# Patient Record
Sex: MALE | Race: WHITE | NOT HISPANIC OR LATINO | Employment: FULL TIME | URBAN - METROPOLITAN AREA
[De-identification: names, ages, dates, MRNs, and addresses within clinical notes are randomized per-mention and may not be internally consistent; named-entity substitution may affect disease eponyms.]

---

## 2022-02-22 ENCOUNTER — OFFICE VISIT (OUTPATIENT)
Dept: ENDOCRINOLOGY | Facility: CLINIC | Age: 62
End: 2022-02-22

## 2022-02-22 VITALS
WEIGHT: 220 LBS | OXYGEN SATURATION: 97 % | DIASTOLIC BLOOD PRESSURE: 74 MMHG | HEART RATE: 58 BPM | SYSTOLIC BLOOD PRESSURE: 118 MMHG | HEIGHT: 70 IN | BODY MASS INDEX: 31.5 KG/M2

## 2022-02-22 DIAGNOSIS — E11.65 UNCONTROLLED TYPE 2 DIABETES MELLITUS WITH HYPERGLYCEMIA: Primary | ICD-10-CM

## 2022-02-22 LAB
EXPIRATION DATE: NORMAL
EXPIRATION DATE: NORMAL
GLUCOSE BLDC GLUCOMTR-MCNC: 130 MG/DL (ref 70–130)
HBA1C MFR BLD: 8.1 %
Lab: NORMAL
Lab: NORMAL

## 2022-02-22 PROCEDURE — 82947 ASSAY GLUCOSE BLOOD QUANT: CPT | Performed by: INTERNAL MEDICINE

## 2022-02-22 PROCEDURE — 99204 OFFICE O/P NEW MOD 45 MIN: CPT | Performed by: INTERNAL MEDICINE

## 2022-02-22 PROCEDURE — 83036 HEMOGLOBIN GLYCOSYLATED A1C: CPT | Performed by: INTERNAL MEDICINE

## 2022-02-22 PROCEDURE — 3052F HG A1C>EQUAL 8.0%<EQUAL 9.0%: CPT | Performed by: INTERNAL MEDICINE

## 2022-02-22 RX ORDER — BLOOD-GLUCOSE METER
KIT MISCELLANEOUS DAILY
COMMUNITY
Start: 2022-01-25

## 2022-02-22 RX ORDER — EMPAGLIFLOZIN 10 MG/1
TABLET, FILM COATED ORAL DAILY
COMMUNITY
Start: 2021-12-09 | End: 2022-12-14 | Stop reason: SDUPTHER

## 2022-02-22 RX ORDER — MULTIPLE VITAMINS W/ MINERALS TAB 9MG-400MCG
1 TAB ORAL DAILY
COMMUNITY

## 2022-02-22 RX ORDER — PIOGLITAZONEHYDROCHLORIDE 30 MG/1
TABLET ORAL DAILY
COMMUNITY
Start: 2021-12-03 | End: 2022-02-22

## 2022-02-22 RX ORDER — INSULIN DETEMIR 100 [IU]/ML
INJECTION, SOLUTION SUBCUTANEOUS
COMMUNITY
Start: 2022-02-02

## 2022-02-22 RX ORDER — FUROSEMIDE 20 MG/1
TABLET ORAL DAILY
COMMUNITY
Start: 2021-12-03 | End: 2022-06-14

## 2022-02-22 RX ORDER — LANOLIN ALCOHOL/MO/W.PET/CERES
CREAM (GRAM) TOPICAL DAILY
COMMUNITY

## 2022-02-22 RX ORDER — CARVEDILOL 25 MG/1
TABLET ORAL
COMMUNITY
Start: 2021-12-13

## 2022-02-22 RX ORDER — PEN NEEDLE, DIABETIC 32 GX 1/4"
NEEDLE, DISPOSABLE MISCELLANEOUS
COMMUNITY
Start: 2021-11-22

## 2022-02-22 RX ORDER — METFORMIN HYDROCHLORIDE 500 MG/1
1000 TABLET, EXTENDED RELEASE ORAL
Qty: 180 TABLET | Refills: 3 | Status: SHIPPED | OUTPATIENT
Start: 2022-02-22 | End: 2022-12-14 | Stop reason: SDUPTHER

## 2022-02-22 RX ORDER — MELOXICAM 15 MG/1
TABLET ORAL DAILY
COMMUNITY
Start: 2021-11-17

## 2022-02-22 RX ORDER — ASPIRIN 81 MG/1
81 TABLET, CHEWABLE ORAL DAILY
COMMUNITY

## 2022-02-22 RX ORDER — LANCETS 28 GAUGE
EACH MISCELLANEOUS DAILY
COMMUNITY
Start: 2022-01-25

## 2022-02-22 RX ORDER — VALSARTAN AND HYDROCHLOROTHIAZIDE 320; 25 MG/1; MG/1
TABLET, FILM COATED ORAL DAILY
COMMUNITY
Start: 2022-01-03

## 2022-02-22 RX ORDER — GLIPIZIDE 5 MG/1
TABLET, FILM COATED, EXTENDED RELEASE ORAL DAILY
COMMUNITY
Start: 2021-11-17 | End: 2022-02-22

## 2022-02-22 NOTE — PROGRESS NOTES
"     Office Note      Date: 2022  Patient Name: Tk Penaloza  MRN: [id number]  : 1960    Chief Complaint   Patient presents with   • Diabetes       History of Present Illness:   Tk Penaloza is a 61 y.o. male who presents for Diabetes - type 2  He is seen as a new patient today  -----------------------------------------  He was diagnosed about 8 years ago.  At diagnosis his sugar was >1300 and he was treated in the ER and sent home on metformin.  He is currently on ramon/ jardiance/ sfu and insulin. Metformin was stopped when he went on insulin   He did not tolerate trulicity     Last A1c: 9.3      Subjective      Diabetic Complications:  Eyes: No  Kidneys: No  Feet: No  Heart: No    Diet and Exercise:  Meals per day: 3  Minutes of exercise per week: not much    Review of Systems:   Review of Systems   Constitutional: Positive for fatigue and unexpected weight change.   Eyes: Positive for visual disturbance.   Respiratory: Positive for shortness of breath.    Cardiovascular: Positive for leg swelling.   Endocrine: Negative for cold intolerance and heat intolerance.   Psychiatric/Behavioral: Positive for sleep disturbance.       The following portions of the patient's history were reviewed and updated as appropriate: allergies, current medications, past family history, past medical history, past social history, past surgical history and problem list.    Objective     Visit Vitals  /74   Pulse 58   Ht 177.8 cm (70\")   Wt 99.8 kg (220 lb)   SpO2 97%   BMI 31.57 kg/m²       Labs:    CMP  No results found for: GLUCOSE, BUN, CREATININE, EGFRIFNONA, EGFRIFAFRI, BCR, K, CO2, CALCIUM, PROTENTOTREF, LABIL2, BILIRUBIN, AST, ALT     CBC w/DIFF  No results found for: WBC, RBC, HGB, HCT, MCV, MCH, MCHC, RDW, RDWSD, MPV, PLT, NEUTRORELPCT, LYMPHORELPCT, MONORELPCT, EOSRELPCT, BASORELPCT, AUTOIGPER, NEUTROABS, LYMPHSABS, MONOSABS, EOSABS, BASOSABS, AUTOIGNUM, NRBC    Physical Exam:  Physical Exam  Vitals reviewed. "   Constitutional:       General: He is not in acute distress.     Appearance: Normal appearance. He is obese. He is not ill-appearing, toxic-appearing or diaphoretic.   HENT:      Head: Normocephalic and atraumatic.   Eyes:      Extraocular Movements: Extraocular movements intact.   Neck:      Comments: No goiter  Cardiovascular:      Rate and Rhythm: Normal rate and regular rhythm.      Pulses:           Dorsalis pedis pulses are 2+ on the right side and 2+ on the left side.        Posterior tibial pulses are 2+ on the right side and 2+ on the left side.   Pulmonary:      Effort: Pulmonary effort is normal. No respiratory distress.   Musculoskeletal:      Right foot: Normal range of motion. No deformity, bunion, Charcot foot, foot drop or prominent metatarsal heads.      Left foot: Normal range of motion. No deformity, bunion, Charcot foot, foot drop or prominent metatarsal heads.   Feet:      Right foot:      Protective Sensation: 5 sites tested. 5 sites sensed.      Skin integrity: Skin integrity normal.      Toenail Condition: Right toenails are normal.      Left foot:      Protective Sensation: 5 sites tested. 5 sites sensed.      Skin integrity: Skin integrity normal.      Toenail Condition: Left toenails are normal.      Comments: Diabetic Foot Exam Performed and Monofilament Test Performed    Lymphadenopathy:      Cervical: No cervical adenopathy.   Neurological:      Mental Status: He is alert.   Psychiatric:         Mood and Affect: Mood normal.         Thought Content: Thought content normal.         Judgment: Judgment normal.         Assessment / Plan      Assessment & Plan:  Problem List Items Addressed This Visit        Other    Uncontrolled type 2 diabetes mellitus with hyperglycemia (HCC) - Primary    Overview     Intolerant of trulicity          Current Assessment & Plan      a1c well above goal.  With heart disease and edema probably ought not to take ramon. I would prefer he be on metformin and he  agrees to try again. Will use januvia.   Could maybe use soliqua in future as its glp1 is easier to tolerate than trulicity.  Diet and exercise options were reviewed in detail.          Relevant Medications    Levemir FlexTouch 100 UNIT/ML injection    Jardiance 10 MG tablet tablet    SITagliptin (Januvia) 100 MG tablet    metFORMIN ER (GLUCOPHAGE-XR) 500 MG 24 hr tablet    Other Relevant Orders    POC Glycosylated Hemoglobin (Hb A1C) (Completed)    POC Glucose, Blood (Completed)           Dipak Harris MD   02/22/2022

## 2022-02-22 NOTE — ASSESSMENT & PLAN NOTE
a1c well above goal.  With heart disease and edema probably ought not to take ramon. I would prefer he be on metformin and he agrees to try again. Will use januvia.   Could maybe use soliqua in future as its glp1 is easier to tolerate than trulicity.  Diet and exercise options were reviewed in detail.

## 2022-06-14 ENCOUNTER — OFFICE VISIT (OUTPATIENT)
Dept: ENDOCRINOLOGY | Facility: CLINIC | Age: 62
End: 2022-06-14

## 2022-06-14 VITALS
DIASTOLIC BLOOD PRESSURE: 60 MMHG | BODY MASS INDEX: 28.92 KG/M2 | OXYGEN SATURATION: 96 % | SYSTOLIC BLOOD PRESSURE: 124 MMHG | HEIGHT: 70 IN | WEIGHT: 202 LBS | HEART RATE: 64 BPM

## 2022-06-14 DIAGNOSIS — E11.65 UNCONTROLLED TYPE 2 DIABETES MELLITUS WITH HYPERGLYCEMIA: Primary | ICD-10-CM

## 2022-06-14 LAB
EXPIRATION DATE: ABNORMAL
EXPIRATION DATE: NORMAL
GLUCOSE BLDC GLUCOMTR-MCNC: 150 MG/DL (ref 70–130)
HBA1C MFR BLD: 6.3 %
Lab: ABNORMAL
Lab: NORMAL

## 2022-06-14 PROCEDURE — 3044F HG A1C LEVEL LT 7.0%: CPT | Performed by: INTERNAL MEDICINE

## 2022-06-14 PROCEDURE — 83036 HEMOGLOBIN GLYCOSYLATED A1C: CPT | Performed by: INTERNAL MEDICINE

## 2022-06-14 PROCEDURE — 82947 ASSAY GLUCOSE BLOOD QUANT: CPT | Performed by: INTERNAL MEDICINE

## 2022-06-14 PROCEDURE — 99213 OFFICE O/P EST LOW 20 MIN: CPT | Performed by: INTERNAL MEDICINE

## 2022-06-14 RX ORDER — ALIROCUMAB 150 MG/ML
INJECTION, SOLUTION SUBCUTANEOUS
COMMUNITY
Start: 2022-06-13

## 2022-06-14 RX ORDER — BEMPEDOIC ACID 180 MG/1
180 TABLET, FILM COATED ORAL DAILY
COMMUNITY
Start: 2022-06-03 | End: 2022-12-14

## 2022-06-14 RX ORDER — BUPROPION HYDROCHLORIDE 75 MG/1
75 TABLET ORAL DAILY
COMMUNITY
Start: 2022-06-10 | End: 2022-12-14

## 2022-06-14 NOTE — PROGRESS NOTES
"     Office Note      Date: 2022  Patient Name: Tk Penaloza  MRN: [id number]  : 1960    Chief Complaint   Patient presents with   • Diabetes       History of Present Illness:   Tk Penaloza is a 62 y.o. male who presents for Diabetes - last time I added metformin and januvia.  He is tolerating them  He has lost 18 pounds.  He is feeling better.    He has had a couple of lows in the morning    Last A1c:  Hemoglobin A1C   Date Value Ref Range Status   2022 6.3 % Final       Changes in health since last visit: non3 . Last eye exam up to date.    Subjective            Review of Systems:   Review of Systems   Constitutional: Negative.    HENT: Negative.    Eyes: Negative.    Respiratory: Negative.        The following portions of the patient's history were reviewed and updated as appropriate: allergies, current medications, past family history, past medical history, past social history, past surgical history and problem list.    Objective     Visit Vitals  /60   Pulse 64   Ht 177.8 cm (70\")   Wt 91.6 kg (202 lb)   SpO2 96%   BMI 28.98 kg/m²       Labs:    CMP  No results found for: GLUCOSE, BUN, CREATININE, EGFRIFNONA, EGFRIFAFRI, BCR, K, CO2, CALCIUM, PROTENTOTREF, LABIL2, BILIRUBIN, AST, ALT     CBC w/DIFF  No results found for: WBC, RBC, HGB, HCT, MCV, MCH, MCHC, RDW, RDWSD, MPV, PLT, NEUTRORELPCT, LYMPHORELPCT, MONORELPCT, EOSRELPCT, BASORELPCT, AUTOIGPER, NEUTROABS, LYMPHSABS, MONOSABS, EOSABS, BASOSABS, AUTOIGNUM, NRBC    Physical Exam:  Physical Exam  Vitals reviewed.   Constitutional:       Appearance: Normal appearance.   Neurological:      Mental Status: He is alert.   Psychiatric:         Mood and Affect: Mood normal.         Thought Content: Thought content normal.         Judgment: Judgment normal.          Assessment / Plan      Assessment & Plan:  Problem List Items Addressed This Visit        Other    Uncontrolled type 2 diabetes mellitus with hyperglycemia (HCC) - Primary    " Overview     Intolerant of trulicity            Current Assessment & Plan      Dramatic improvement in a1c and weight  Reduce insulin to 70 units per day            Relevant Medications    Levemir FlexTouch 100 UNIT/ML injection    Jardiance 10 MG tablet tablet    SITagliptin (Januvia) 100 MG tablet    metFORMIN ER (GLUCOPHAGE-XR) 500 MG 24 hr tablet    Other Relevant Orders    POC Glucose, Blood (Completed)    POC Glycosylated Hemoglobin (Hb A1C) (Completed)           Dipak Harris MD   06/14/2022

## 2022-12-14 ENCOUNTER — OFFICE VISIT (OUTPATIENT)
Dept: ENDOCRINOLOGY | Facility: CLINIC | Age: 62
End: 2022-12-14

## 2022-12-14 VITALS
HEART RATE: 79 BPM | DIASTOLIC BLOOD PRESSURE: 77 MMHG | OXYGEN SATURATION: 95 % | SYSTOLIC BLOOD PRESSURE: 118 MMHG | BODY MASS INDEX: 28.92 KG/M2 | HEIGHT: 70 IN | WEIGHT: 202 LBS

## 2022-12-14 DIAGNOSIS — E11.65 UNCONTROLLED TYPE 2 DIABETES MELLITUS WITH HYPERGLYCEMIA: Primary | ICD-10-CM

## 2022-12-14 LAB
EXPIRATION DATE: ABNORMAL
EXPIRATION DATE: NORMAL
GLUCOSE BLDC GLUCOMTR-MCNC: 245 MG/DL (ref 70–130)
HBA1C MFR BLD: 8.3 %
Lab: ABNORMAL
Lab: NORMAL

## 2022-12-14 PROCEDURE — 3052F HG A1C>EQUAL 8.0%<EQUAL 9.0%: CPT | Performed by: INTERNAL MEDICINE

## 2022-12-14 PROCEDURE — 99213 OFFICE O/P EST LOW 20 MIN: CPT | Performed by: INTERNAL MEDICINE

## 2022-12-14 PROCEDURE — 83036 HEMOGLOBIN GLYCOSYLATED A1C: CPT | Performed by: INTERNAL MEDICINE

## 2022-12-14 PROCEDURE — 82947 ASSAY GLUCOSE BLOOD QUANT: CPT | Performed by: INTERNAL MEDICINE

## 2022-12-14 RX ORDER — METFORMIN HYDROCHLORIDE 500 MG/1
1000 TABLET, EXTENDED RELEASE ORAL
Qty: 180 TABLET | Refills: 3 | Status: SHIPPED | OUTPATIENT
Start: 2022-12-14

## 2022-12-14 RX ORDER — BUPROPION HYDROCHLORIDE 150 MG/1
TABLET ORAL
COMMUNITY
Start: 2022-11-14

## 2022-12-14 RX ORDER — EMPAGLIFLOZIN 10 MG/1
10 TABLET, FILM COATED ORAL DAILY
Qty: 90 TABLET | Refills: 3 | Status: SHIPPED | OUTPATIENT
Start: 2022-12-14

## 2022-12-14 NOTE — PROGRESS NOTES
"     Office Note      Date: 2022  Patient Name: Tk Penaloza  MRN: [id number]  : 1960    Chief Complaint   Patient presents with   • Diabetes       History of Present Illness:   Tk Penaloza is a 62 y.o. male who presents for Diabetes -type 2  Rx: jardiance, januvia, metformin and insulin     bg checks are done weekly  Last A1c:6.6  Hemoglobin A1C   Date Value Ref Range Status   2022 8.3 % Final   hypoglycemia is rare     Changes in health since last visit:has been eating some sweets over the holidays  . Last eye exam up to date.    Subjective            Review of Systems:   Review of Systems   Constitutional: Negative.    HENT: Negative.    Eyes: Negative.    Respiratory: Negative.        The following portions of the patient's history were reviewed and updated as appropriate: allergies, current medications, past family history, past medical history, past social history, past surgical history and problem list.    Objective     Visit Vitals  /77   Pulse 79   Ht 177.8 cm (70\")   Wt 91.6 kg (202 lb)   SpO2 95%   BMI 28.98 kg/m²         Physical Exam:  Physical Exam  Vitals reviewed.   Constitutional:       Appearance: Normal appearance.   Neurological:      Mental Status: He is alert.   Psychiatric:         Mood and Affect: Mood normal.         Thought Content: Thought content normal.         Judgment: Judgment normal.          Assessment / Plan      Assessment & Plan:  Problem List Items Addressed This Visit        Other    Uncontrolled type 2 diabetes mellitus with hyperglycemia (HCC) - Primary    Overview     Intolerant of trulicity          Current Assessment & Plan     Diabetes is worsening.   due to poor diet choices   Diabetes will be reassessed in 6 months.         Relevant Medications    Levemir FlexTouch 100 UNIT/ML injection    Jardiance 10 MG tablet tablet    SITagliptin (Januvia) 100 MG tablet    metFORMIN ER (GLUCOPHAGE-XR) 500 MG 24 hr tablet        Dipak Harris MD "   12/14/2022

## 2023-06-14 ENCOUNTER — OFFICE VISIT (OUTPATIENT)
Dept: ENDOCRINOLOGY | Facility: CLINIC | Age: 63
End: 2023-06-14
Payer: MEDICAID

## 2023-06-14 VITALS
HEIGHT: 70 IN | DIASTOLIC BLOOD PRESSURE: 74 MMHG | HEART RATE: 72 BPM | SYSTOLIC BLOOD PRESSURE: 120 MMHG | WEIGHT: 201 LBS | OXYGEN SATURATION: 98 % | BODY MASS INDEX: 28.77 KG/M2

## 2023-06-14 DIAGNOSIS — E11.65 UNCONTROLLED TYPE 2 DIABETES MELLITUS WITH HYPERGLYCEMIA: Primary | ICD-10-CM

## 2023-06-14 LAB
EXPIRATION DATE: NORMAL
EXPIRATION DATE: NORMAL
GLUCOSE BLDC GLUCOMTR-MCNC: 96 MG/DL (ref 70–130)
HBA1C MFR BLD: 7.9 %
Lab: NORMAL
Lab: NORMAL

## 2023-06-14 RX ORDER — BLOOD-GLUCOSE SENSOR
1 EACH MISCELLANEOUS
Qty: 2 EACH | Refills: 11 | Status: SHIPPED | OUTPATIENT
Start: 2023-06-14

## 2023-06-14 NOTE — ASSESSMENT & PLAN NOTE
Diabetes is improving with lifestyle modifications.   Continue current treatment regimen.  Diabetes will be reassessed in 6 months  The plan is to try to get him on a lee.

## 2023-06-14 NOTE — PROGRESS NOTES
"     Office Note      Date: 2023  Patient Name: Tk Penaloza  MRN: [id number]  : 1960    Chief Complaint   Patient presents with    Diabetes       History of Present Illness:   Tk Penaloza is a 63 y.o. male who presents for Diabetes type 2.   Current RX 2insulin shots per day/ jardiance/ januvia and metformin     Bg checks are done: sometimes   Hypoglycemia :mild lows once a month      Last A1c:  Hemoglobin A1C   Date Value Ref Range Status   2023 7.9 % Final       Changes in health since last visit: none . Last eye exam due and advised  .    Subjective              Review of Systems:   Review of Systems   Constitutional: Negative.    HENT: Negative.     Eyes: Negative.    Respiratory: Negative.       The following portions of the patient's history were reviewed and updated as appropriate: allergies, current medications, past family history, past medical history, past social history, past surgical history, and problem list.    Objective     Visit Vitals  /74   Pulse 72   Ht 177.8 cm (70\")   Wt 91.2 kg (201 lb)   SpO2 98%   BMI 28.84 kg/m²           Physical Exam:  Physical Exam  Vitals reviewed.   Constitutional:       Appearance: Normal appearance. He is normal weight.   Cardiovascular:      Pulses:           Dorsalis pedis pulses are 2+ on the right side and 2+ on the left side.        Posterior tibial pulses are 2+ on the right side and 2+ on the left side.   Musculoskeletal:      Right foot: Normal range of motion. No deformity, bunion, Charcot foot, foot drop or prominent metatarsal heads.      Left foot: Normal range of motion. No deformity, bunion, Charcot foot, foot drop or prominent metatarsal heads.   Feet:      Right foot:      Protective Sensation: 10 sites tested.  10 sites sensed.      Skin integrity: Skin integrity normal.      Toenail Condition: Right toenails are normal.      Left foot:      Protective Sensation: 10 sites tested.  10 sites sensed.      Skin integrity: Skin " integrity normal.      Toenail Condition: Left toenails are normal.      Comments: Diabetic Foot Exam Performed and Monofilament Test Performed    Neurological:      Mental Status: He is alert.   Psychiatric:         Mood and Affect: Mood normal.         Behavior: Behavior normal.         Thought Content: Thought content normal.         Judgment: Judgment normal.        Assessment / Plan      Assessment & Plan:  Problem List Items Addressed This Visit          Other    Uncontrolled type 2 diabetes mellitus with hyperglycemia - Primary    Overview     Intolerant of trulicity          Current Assessment & Plan     Diabetes is improving with lifestyle modifications.   Continue current treatment regimen.  Diabetes will be reassessed in 6 months  The plan is to try to get him on a bertin.         Relevant Medications    Levemir FlexTouch 100 UNIT/ML injection    metFORMIN ER (GLUCOPHAGE-XR) 500 MG 24 hr tablet    Jardiance 10 MG tablet tablet    SITagliptin (Januvia) 100 MG tablet    Continuous Blood Gluc Sensor (FreeStyle Bertin 3 Sensor) misc    Other Relevant Orders    POC Glucose, Blood (Completed)    POC Glycosylated Hemoglobin (Hb A1C) (Completed)    Microalbumin / Creatinine Urine Ratio - Urine, Clean Catch        Dipak Orville Harris MD   06/14/2023

## 2023-06-15 LAB
ALBUMIN/CREAT UR: 46 MG/G CREAT (ref 0–29)
CREAT UR-MCNC: 53 MG/DL
MICROALBUMIN UR-MCNC: 24.5 UG/ML

## 2023-10-17 ENCOUNTER — TELEPHONE (OUTPATIENT)
Dept: ENDOCRINOLOGY | Facility: CLINIC | Age: 63
End: 2023-10-17

## 2023-10-17 NOTE — TELEPHONE ENCOUNTER
Spoke with patients wife.  She states that he is due for a refill on Jardiance and Januvia.  She was not sure if he was supposed to be taking both.  She thought he was supposed to stop one of them.  She states that she thought someone called her after last appointment and told them to stop one of them.  Could not find in chart.

## 2023-10-17 NOTE — TELEPHONE ENCOUNTER
PATIENTS WIFE WOULD LIKE A CALL BACK TO DISCUSS PATIENTS MEDICATIONS. SHE IS NOT SURE WHAT MEDICATIONS PATIENT IS SUPPOSE TO TAKE AND STOP TAKING. PHONE NUMBER [phone]

## 2024-01-10 RX ORDER — METFORMIN HYDROCHLORIDE 500 MG/1
1000 TABLET, EXTENDED RELEASE ORAL
Qty: 180 TABLET | Refills: 3 | Status: SHIPPED | OUTPATIENT
Start: 2024-01-10

## 2024-01-10 NOTE — TELEPHONE ENCOUNTER
Rx Refill Note  Requested Prescriptions     Pending Prescriptions Disp Refills    metFORMIN ER (GLUCOPHAGE-XR) 500 MG 24 hr tablet [Pharmacy Med Name: METFORMIN HCL  MG TABLET] 180 tablet 3     Sig: TAKE TWO TABLETS BY MOUTH DAILY WITH BREAKFAST          Last office visit with prescribing clinician: 6/14/2023     Next office visit with prescribing clinician: 4/25/2024         Dariela Kilpatrick MA  01/10/24, 11:46 EST

## 2024-01-19 RX ORDER — SITAGLIPTIN 100 MG/1
100 TABLET, FILM COATED ORAL DAILY
Qty: 90 TABLET | Refills: 3 | Status: SHIPPED | OUTPATIENT
Start: 2024-01-19

## 2024-01-19 NOTE — TELEPHONE ENCOUNTER
Rx Refill Note  Requested Prescriptions     Pending Prescriptions Disp Refills    Januvia 100 MG tablet [Pharmacy Med Name: JANUVIA 100 MG TABLET] 90 tablet 3     Sig: TAKE ONE TABLET BY MOUTH DAILY          Last office visit with prescribing clinician: 6/14/2023     Next office visit with prescribing clinician: 4/25/2024         Dariela Kilpatrick MA  01/19/24, 11:53 EST Rx Refill Note  Requested Prescriptions     Pending Prescriptions Disp Refills    Januvia 100 MG tablet [Pharmacy Med Name: JANUVIA 100 MG TABLET] 90 tablet 3     Sig: TAKE ONE TABLET BY MOUTH DAILY          Last office visit with prescribing clinician: 6/14/2023     Next office visit with prescribing clinician: 4/25/2024         Dariela Kilpatrick MA  01/19/24, 11:46 EST

## 2024-02-08 RX ORDER — EMPAGLIFLOZIN 10 MG/1
10 TABLET, FILM COATED ORAL DAILY
Qty: 90 TABLET | Refills: 0 | Status: SHIPPED | OUTPATIENT
Start: 2024-02-08

## 2024-02-08 NOTE — TELEPHONE ENCOUNTER
Rx Refill Note  Requested Prescriptions     Pending Prescriptions Disp Refills    Jardiance 10 MG tablet tablet [Pharmacy Med Name: JARDIANCE 10 MG TABLET] 90 tablet 3     Sig: TAKE ONE TABLET BY MOUTH DAILY      Last office visit with prescribing clinician: 6/14/2023     Next office visit with prescribing clinician: 4/25/2024

## 2024-04-25 ENCOUNTER — OFFICE VISIT (OUTPATIENT)
Dept: ENDOCRINOLOGY | Facility: CLINIC | Age: 64
End: 2024-04-25
Payer: MEDICAID

## 2024-04-25 VITALS
HEIGHT: 70 IN | WEIGHT: 196.4 LBS | BODY MASS INDEX: 28.12 KG/M2 | HEART RATE: 68 BPM | SYSTOLIC BLOOD PRESSURE: 124 MMHG | DIASTOLIC BLOOD PRESSURE: 72 MMHG

## 2024-04-25 DIAGNOSIS — E11.65 UNCONTROLLED TYPE 2 DIABETES MELLITUS WITH HYPERGLYCEMIA: Primary | ICD-10-CM

## 2024-04-25 LAB
EXPIRATION DATE: ABNORMAL
EXPIRATION DATE: NORMAL
GLUCOSE BLDC GLUCOMTR-MCNC: 125 MG/DL (ref 70–130)
HBA1C MFR BLD: 8.1 % (ref 4.5–5.7)
Lab: ABNORMAL
Lab: NORMAL

## 2024-04-25 RX ORDER — REPAGLINIDE 2 MG/1
2 TABLET ORAL
Qty: 90 TABLET | Refills: 11 | Status: SHIPPED | OUTPATIENT
Start: 2024-04-25 | End: 2025-04-25

## 2024-04-25 RX ORDER — EMPAGLIFLOZIN 10 MG/1
10 TABLET, FILM COATED ORAL DAILY
Qty: 90 TABLET | Refills: 3 | Status: SHIPPED | OUTPATIENT
Start: 2024-04-25

## 2024-04-25 NOTE — PROGRESS NOTES
"     Office Note      Date: 2024  Patient Name: Tk Penaloza  MRN: [id number]  : 1960    Chief Complaint   Patient presents with    Diabetes       History of Present Illness:   Tk Penaloza is a 64 y.o. male who presents for Diabetes type 2.   Current RX JANUVIA/ METFORMIN/ SGLT2 AND INSULIN     Bg checks are done:COUPLE TIMES A WEEK    Hypoglycemia :80'S IN THE MORNING. HIGHER LATER IN THE  DAY       Last A1c:  Hemoglobin A1C   Date Value Ref Range Status   2024 8.1 4.5 - 5.7 % Final       Changes in health since last visit: NONE . HAD A CORTISONE SHOT LAST WEEK. HAS HAD A COUPLE OF THOSE  Last eye exam GOING ON 2 YEARS .    Subjective              Review of Systems:   Review of Systems   Endocrine: Positive for polydipsia and polyuria.       The following portions of the patient's history were reviewed and updated as appropriate: allergies, current medications, past family history, past medical history, past social history, past surgical history, and problem list.    Objective     Visit Vitals  /72   Pulse 68   Ht 177.8 cm (70\")   Wt 89.1 kg (196 lb 6.4 oz)   BMI 28.18 kg/m²           Physical Exam:  Physical Exam  Vitals reviewed.   Constitutional:       Appearance: Normal appearance.   Neurological:      Mental Status: He is alert.   Psychiatric:         Mood and Affect: Mood normal.         Behavior: Behavior normal.         Thought Content: Thought content normal.         Judgment: Judgment normal.          Assessment / Plan      Assessment & Plan:  Problem List Items Addressed This Visit       Uncontrolled type 2 diabetes mellitus with hyperglycemia - Primary    Overview     Intolerant of trulicity          Current Assessment & Plan     A1C UP TO 8.1    THE PLAN IS ADDING PRANDIN AT MEALS         Relevant Medications    Levemir FlexTouch 100 UNIT/ML injection    Continuous Blood Gluc Sensor (FreeStyle Bertin 3 Sensor) misc    metFORMIN ER (GLUCOPHAGE-XR) 500 MG 24 hr tablet    Januvia 100 " MG tablet    repaglinide (PRANDIN) 2 MG tablet    Jardiance 10 MG tablet tablet    Other Relevant Orders    POC Glucose, Blood (Completed)    POC Glycosylated Hemoglobin (Hb A1C) (Completed)         Electronically signed by : Dipak Harris MD  04/25/2024

## 2024-10-28 ENCOUNTER — OFFICE VISIT (OUTPATIENT)
Age: 64
End: 2024-10-28
Payer: MEDICAID

## 2024-10-28 VITALS
OXYGEN SATURATION: 97 % | WEIGHT: 196 LBS | DIASTOLIC BLOOD PRESSURE: 74 MMHG | HEART RATE: 81 BPM | BODY MASS INDEX: 28.06 KG/M2 | SYSTOLIC BLOOD PRESSURE: 126 MMHG | HEIGHT: 70 IN

## 2024-10-28 DIAGNOSIS — E11.65 UNCONTROLLED TYPE 2 DIABETES MELLITUS WITH HYPERGLYCEMIA: Primary | ICD-10-CM

## 2024-10-28 LAB
EXPIRATION DATE: ABNORMAL
EXPIRATION DATE: ABNORMAL
GLUCOSE BLDC GLUCOMTR-MCNC: 189 MG/DL (ref 70–130)
HBA1C MFR BLD: 8.2 % (ref 4.5–5.7)
Lab: ABNORMAL
Lab: ABNORMAL

## 2024-10-28 PROCEDURE — 86341 ISLET CELL ANTIBODY: CPT | Performed by: INTERNAL MEDICINE

## 2024-10-28 PROCEDURE — 82043 UR ALBUMIN QUANTITATIVE: CPT | Performed by: INTERNAL MEDICINE

## 2024-10-28 PROCEDURE — 82570 ASSAY OF URINE CREATININE: CPT | Performed by: INTERNAL MEDICINE

## 2024-10-28 RX ORDER — CLOPIDOGREL BISULFATE 75 MG/1
75 TABLET ORAL DAILY
COMMUNITY

## 2024-10-28 NOTE — PROGRESS NOTES
"     Office Note      Date: 10/28/2024  Patient Name: Tk Penaloza  MRN: [id number]  : 1960    Chief Complaint   Patient presents with    Diabetes       History of Present Illness:   Tk Penaloza is a 64 y.o. male who presents for Diabetes type 2.   Current RX pills and insulin     Bg checks are done: occasionally   Hypoglycemia :rare       Last A1c:  Hemoglobin A1C   Date Value Ref Range Status   10/28/2024 8.2 (A) 4.5 - 5.7 % Final       Changes in health since last visit: son was diagnosed with type 1 . Last eye exam up to date.    Subjective            Review of Systems:   Review of Systems   Endocrine: Positive for polydipsia and polyuria.       The following portions of the patient's history were reviewed and updated as appropriate: allergies, current medications, past family history, past medical history, past social history, past surgical history, and problem list.    Objective     Visit Vitals  /74 (BP Location: Left arm, Patient Position: Sitting, Cuff Size: Adult)   Pulse 81   Ht 177.8 cm (70\")   Wt 88.9 kg (196 lb)   SpO2 97%   BMI 28.12 kg/m²           Physical Exam:  Physical Exam  Vitals reviewed.   Constitutional:       Appearance: Normal appearance. He is normal weight.   Cardiovascular:      Pulses:           Dorsalis pedis pulses are 2+ on the right side and 2+ on the left side.        Posterior tibial pulses are 2+ on the right side and 2+ on the left side.   Musculoskeletal:      Right foot: Normal range of motion. No deformity, bunion, Charcot foot, foot drop or prominent metatarsal heads.      Left foot: Normal range of motion. No deformity, bunion, Charcot foot, foot drop or prominent metatarsal heads.   Feet:      Right foot:      Protective Sensation: 10 sites tested.  10 sites sensed.      Skin integrity: Skin integrity normal.      Toenail Condition: Right toenails are normal.      Left foot:      Protective Sensation: 10 sites tested.  10 sites sensed.      Skin integrity: Skin " integrity normal.      Toenail Condition: Left toenails are normal.      Comments: Diabetic Foot Exam Performed    Neurological:      Mental Status: He is alert.   Psychiatric:         Mood and Affect: Mood normal.         Behavior: Behavior normal.         Thought Content: Thought content normal.         Judgment: Judgment normal.          Assessment / Plan      Assessment & Plan:  Problem List Items Addressed This Visit       Uncontrolled type 2 diabetes mellitus with hyperglycemia - Primary    Overview     Intolerant of trulicity          Current Assessment & Plan      Worse.. the plan is to check serologies since son has type 1.  In mean time, diet and exercise are the keys          Relevant Medications    Levemir FlexTouch 100 UNIT/ML injection    Continuous Blood Gluc Sensor (FreeStyle Bertin 3 Sensor) misc    metFORMIN ER (GLUCOPHAGE-XR) 500 MG 24 hr tablet    Januvia 100 MG tablet    repaglinide (PRANDIN) 2 MG tablet    Jardiance 10 MG tablet tablet    Other Relevant Orders    POC Glucose, Blood (Completed)    POC Glycosylated Hemoglobin (Hb A1C) (Completed)    Glutamic Acid Decarboxylase    Microalbumin / Creatinine Urine Ratio - Urine, Clean Catch         Electronically signed by : Dipak Harris MD  10/28/2024

## 2024-10-28 NOTE — ASSESSMENT & PLAN NOTE
Worse.. the plan is to check serologies since son has type 1.  In mean time, diet and exercise are the keys

## 2024-10-29 ENCOUNTER — SPECIALTY PHARMACY (OUTPATIENT)
Dept: GENERAL RADIOLOGY | Facility: HOSPITAL | Age: 64
End: 2024-10-29
Payer: MEDICAID

## 2024-10-29 DIAGNOSIS — E11.65 UNCONTROLLED TYPE 2 DIABETES MELLITUS WITH HYPERGLYCEMIA: ICD-10-CM

## 2024-10-29 LAB
ALBUMIN UR-MCNC: 3.4 MG/DL
CREAT UR-MCNC: 81.8 MG/DL
MICROALBUMIN/CREAT UR: 41.6 MG/G (ref 0–29)

## 2024-10-29 RX ORDER — INSULIN DETEMIR 100 [IU]/ML
80 INJECTION, SOLUTION SUBCUTANEOUS DAILY
Qty: 90 ML | Refills: 3 | Status: SHIPPED | OUTPATIENT
Start: 2024-10-29 | End: 2024-10-29

## 2024-10-29 RX ORDER — METFORMIN HYDROCHLORIDE 500 MG/1
1000 TABLET, EXTENDED RELEASE ORAL
Qty: 180 TABLET | Refills: 3 | Status: SHIPPED | OUTPATIENT
Start: 2024-10-29

## 2024-10-29 RX ORDER — EMPAGLIFLOZIN 10 MG/1
10 TABLET, FILM COATED ORAL DAILY
Qty: 90 TABLET | Refills: 3 | Status: SHIPPED | OUTPATIENT
Start: 2024-10-29

## 2024-10-29 RX ORDER — INSULIN DETEMIR 100 [IU]/ML
80 INJECTION, SOLUTION SUBCUTANEOUS DAILY
Qty: 90 ML | Refills: 3 | Status: SHIPPED | OUTPATIENT
Start: 2024-10-29

## 2024-10-29 RX ORDER — REPAGLINIDE 2 MG/1
2 TABLET ORAL
Qty: 270 TABLET | Refills: 3 | Status: SHIPPED | OUTPATIENT
Start: 2024-10-29 | End: 2025-10-29

## 2024-10-29 NOTE — PROGRESS NOTES
Specialty Pharmacy Patient Management Program  Endocrinology Initial Fill Outreach      Tk is a 64 y.o. male contacted today regarding initial fill of his medication(s).    Specialty medication(s) and dose(s) confirmed: Januvia 100mg daily    Delivery Questions      Flowsheet Row Most Recent Value   Delivery method UPS   Delivery address verified with patient/caregiver? Yes   Delivery address Home   Number of medications in delivery 2   Medication(s) being filled and delivered SITagliptin Phosphate (JANUVIA), Insulin Detemir (Levemir FlexTouch)   Doses left of specialty medications 3   Copay verified? Yes   Copay amount $0   Copay form of payment No copayment ($0)   Ship Date 10/29/24   Delivery Date 10/30/24   Signature Required Yes            Follow-Up: 90 days  Arvind Shannon, BlossomD  Clinical Specialty Pharmacist, Endocrinology  10/29/2024  08:42 EDT

## 2024-10-29 NOTE — PROGRESS NOTES
Specialty Pharmacy Patient Management Program  Per Protocol Prescription Order/Refill     Patient currently fills medications at [facility] and is enrolled in an Endocrinology Patient Management Program.     Requested Prescriptions     Pending Prescriptions Disp Refills    insulin detemir (Levemir FlexPen) 100 UNIT/ML injection 90 mL 3     Sig: Inject 80 Units under the skin into the appropriate area as directed Daily. (Max 100 units daily)     Signed Prescriptions Disp Refills    SITagliptin (Januvia) 100 MG tablet 90 tablet 3     Sig: Take 1 tablet by mouth Daily.    Jardiance 10 MG tablet tablet 90 tablet 3     Sig: Take 1 tablet by mouth Daily.    metFORMIN ER (GLUCOPHAGE-XR) 500 MG 24 hr tablet 180 tablet 3     Sig: Take 2 tablets by mouth Daily With Breakfast.    repaglinide (PRANDIN) 2 MG tablet 270 tablet 3     Sig: Take 1 tablet by mouth 3 (Three) Times a Day Before Meals.     Prescription orders above were sent to the pharmacy per Collaborative Care Agreement Protocol.     Arvind Shannon, PharmD  Clinical Specialty Pharmacist, Endocrinology  10/29/2024  08:15 EDT

## 2024-10-29 NOTE — PROGRESS NOTES
Specialty Pharmacy Patient Management Program  Endocrinology Initial Assessment     Tk Penaloza was referred by an Endocrinology provider to the Endocrinology Patient Management program offered by [facility] for Type 2 Diabetes on 10/29/24.  An initial outreach was conducted, including assessment of therapy appropriateness and specialty medication education for Jean-Claudeuvia and Jardiance. The patient was introduced to services offered by [facility], including: regular assessments, refill coordination, curbside pick-up or mail order delivery options, prior authorization maintenance, and financial assistance programs as applicable. The patient was also provided with contact information for the pharmacy team.     Insurance Coverage & Financial Support  [place] Medicaid     Relevant Past Medical History and Comorbidities  Relevant medical history and concomitant health conditions were discussed with the patient. The patient's chart has been reviewed for relevant past medical history and comorbid conditions and updated as necessary.  Past Medical History:   Diagnosis Date    Hyperlipidemia     Hypertension     Type 2 diabetes mellitus      Social History     Socioeconomic History    Marital status:    Tobacco Use    Smoking status: Never    Smokeless tobacco: Never   Vaping Use    Vaping status: Never Used   Substance and Sexual Activity    Alcohol use: Not Currently     Comment: rarely    Drug use: Never    Sexual activity: Yes     Partners: Female     Birth control/protection: None       Problem list reviewed by Ze Shannon RPH on 10/29/2024 at  8:28 AM    Allergies  Known allergies and reactions were discussed with the patient. The patient's chart has been reviewed for  allergy information and updated as necessary.   No Known Allergies    Allergies reviewed by Ze Shannon RPH on 10/29/2024 at  8:28 AM    Relevant Laboratory Values  Relevant laboratory  "values were discussed with the patient. The following specialty medication dose adjustment(s) are recommended: No Changes  A1C Last 3 Results          4/25/2024    15:07 10/28/2024    15:02   HGBA1C Last 3 Results   Hemoglobin A1C 8.1  8.2      Lab Results   Component Value Date    HGBA1C 8.2 (A) 10/28/2024     No results found for: \"GLUCOSE\", \"CALCIUM\", \"NA\", \"K\", \"CO2\", \"CL\", \"BUN\", \"CREATININE\", \"EGFRIFAFRI\", \"EGFRIFNONA\", \"BCR\", \"ANIONGAP\"  No results found for: \"CHOL\", \"CHLPL\", \"TRIG\", \"HDL\", \"LDL\", \"LDLDIRECT\"  Microalbumin          10/28/2024    16:22   Microalbumin   Microalbumin, Urine 3.4        Current Medication List  This medication list has been reviewed with the patient and evaluated for any interactions or necessary modifications/recommendations, and updated to include all prescription medications, OTC medications, and supplements the patient is currently taking.  This list reflects what is contained in the patient's profile, which has also been marked as reviewed to communicate to other providers it is the most up to date version of the patient's current medication therapy.     Current Outpatient Medications:     Jardiance 10 MG tablet tablet, Take 1 tablet by mouth Daily., Disp: 90 tablet, Rfl: 3    metFORMIN ER (GLUCOPHAGE-XR) 500 MG 24 hr tablet, Take 2 tablets by mouth Daily With Breakfast., Disp: 180 tablet, Rfl: 3    repaglinide (PRANDIN) 2 MG tablet, Take 1 tablet by mouth 3 (Three) Times a Day Before Meals., Disp: 270 tablet, Rfl: 3    SITagliptin (Januvia) 100 MG tablet, Take 1 tablet by mouth Daily., Disp: 90 tablet, Rfl: 3    aspirin 81 MG chewable tablet, Chew 1 tablet Daily., Disp: , Rfl:     BD Pen Needle Micro U/F 32G X 6 MM misc, Twice daily, Disp: , Rfl:     buPROPion XL (WELLBUTRIN XL) 150 MG 24 hr tablet, , Disp: , Rfl:     carvedilol (COREG) 25 MG tablet, 2 (Two) Times a Day., Disp: , Rfl:     clopidogrel (PLAVIX) 75 MG tablet, Take 1 tablet by mouth Daily., Disp: , Rfl:     " Continuous Blood Gluc Sensor (FreeStyle Bertin 3 Sensor) misc, 1 each Every 14 (Fourteen) Days., Disp: 2 each, Rfl: 11    FREESTYLE LITE test strip, Daily., Disp: , Rfl:     insulin detemir (Levemir FlexPen) 100 UNIT/ML injection, Inject 80 Units under the skin into the appropriate area as directed Daily. (Max 100 units daily), Disp: 90 mL, Rfl: 3    Lancets (freestyle) lancets, Daily., Disp: , Rfl:     meloxicam (MOBIC) 15 MG tablet, Daily., Disp: , Rfl:     multivitamin with minerals tablet tablet, Take 1 tablet by mouth Daily., Disp: , Rfl:     Praluent 150 MG/ML injection pen, , Disp: , Rfl:     valsartan-hydrochlorothiazide (DIOVAN-HCT) 320-25 MG per tablet, Daily., Disp: , Rfl:     vitamin B-12 (CYANOCOBALAMIN) 1000 MCG tablet, Take  by mouth Daily. 2500mg daily, Disp: , Rfl:     Medicines reviewed by Ze Shannon [facility] on 10/29/2024 at  8:28 AM    Drug Interactions  No Clinically Significant DDIs Were Identified at Present Time Upon Marking Medications Reviewed    Recommended Medications Assessment  Aspirin: Currently Taking   Statin: Not Taking Currently  ACEi/ARB: Currently Taking     Initial Education Provided for Specialty Medication  The patient has been provided with the following education and any applicable administration techniques (i.e. self-injection) have been demonstrated for the therapies indicated. All questions and concerns have been addressed prior to the patient receiving the medication, and the patient has verbalized comprehension of the education and any materials provided. Additional patient education shall be provided and documented upon request by the patient, provider, or payer.    JARDIANCE® (empagliflozin)  Medication Expectations   Why am I taking this medication? You could be taking this medication for several reasons:  To lower blood sugar because you have type 2 diabetes  To reduce your risk of death from heart attack or stroke if you have heart disease and type 2  diabetes  To reduce your risk of death or hospitalization for heart failure  To reduce your risk of further kidney damage, death, or hospitalization if you have chronic kidney disease   What should I expect while on this medication? You should expect to see your blood sugar and A1c decrease over time if you have diabetes. You may also see a decrease in your blood pressure and it can help some people lose weight.     How does the medication work? Jardiance works by helping to remove some sugar that the body doesn't need through urination.    How long will I be on this medication for? The amount of time you will be on this medication will be determined by your doctor based on blood sugar and A1c control. You will most likely be on this medication or another diabetes medication throughout your lifetime. Do not abruptly stop this medication without talking to your doctor first.    How do I take this medication? Take as directed on your prescription label. This medication is usually taken in the morning and can be given with or without food.    What are some possible side effects? You may notice increased urination, especially when you first start Jardiance. The most common side effects are urinary tract infections and yeast infections and are more commonly seen in females. Talk with your doctor if you notice white or yellow vaginal discharge, vaginal itching or odor of if you notice redness, itching, pain, or swelling of the penis and/or bad-smelling discharge from the penis.    What happens if I miss a dose? If you miss a dose, take it as soon as you remember. If it is close to your next dose, skip it (do not take 2 doses at once)     Medication Safety   What are things I should warn my doctor immediately about? Tell your doctor if you have kidney disease, liver disease, heart failure, pancreas problems, or history of frequent genital yeast or urinary tract infections. Tell your doctor if you are on a low-salt diet, if  you drink alcohol, or if you are having surgery. Talk to your doctor if you are pregnant, planning to become pregnant, or breastfeeding. Also tell your doctor if you notice any signs/symptoms of an allergic reaction (rash, hives, difficulty breathing, etc.).   What are things that I should be cautious of? Be cautious of any side effects from this medication. Talk to your doctor if any new ones develop or aren't getting better.   What are some medications that can interact with this one? Some medications that interact include diuretics (water pills) and other medications that may also lower your blood sugar such as insulins and glipizide/glimepiride/glyburide. Your doctor may reduce the dose of these medications when you start Jardiance to minimize low blood sugars. Always tell your doctor or pharmacist immediately if you start taking any new medications, including over-the-counter medications, vitamins, and herbal supplements.      Medication Storage/Handling   How should I handle this medication? Keep this medication out of reach of pets/children in tightly sealed container   How does this medication need to be stored? Store at room temperature and keep dry (don't keep in bathroom or other room with moisture)   How should I dispose of this medication? There should not be a need to dispose of this medication unless your provider decides to change the dose or therapy. If that is the case, take to your local police station for proper disposal. Some pharmacies also have take-back bins for medication drop-off.      Resources/Support   How can I remind myself to take this medication? You can download reminder apps to help you manage your refills. You may also set an alarm on your phone to remind you. The pharmacy carries pill boxes that you can place next to an area you pass everyday (such as where you place your car keys or where you charge your phone)   Is financial support available?  [employer] can  provide co-pay cards if you have commercial insurance or patient assistance if you have Medicare or no insurance.    Which vaccines are recommended for me? Talk to your doctor about these vaccines: Flu, Coronavirus (COVID-19), Pneumococcal (pneumonia), Tdap, Hepatitis B, Zoster (shingles)      JANUVIA® (sitagliptin)  Medication Expectations   Why am I taking this medication? You are taking this medication to lower blood sugar because you have type 2 diabetes. Diabetes cannot be cured, but with proper medication and treatment, your blood sugar can be kept within your personalized target range.   What should I expect while on this medication? You should expect to see your blood sugar and A1c decrease over time   How does the medication work? Januvia works by helping to increase the amount of insulin released by your pancreas and lowering the amount of sugar your liver makes.   How long will I be on this medication for? The amount of time you will be on this medication will be determined by your doctor based on blood sugar and A1c control. There is a good chance you will be on this medication or another medication for diabetes throughout your lifetime. You should not abruptly stop this medication without talking to your doctor first.     How do I take this medication? Take as directed on your prescription label. Januvia is usually taken once daily and can be administered with or without food.    What are some possible side effects? The most common side effects include sore throat and runny or stuff nose. You may also experience headache or joint stiffness/pain.  Talk with your doctor if you notice these side effects and they do not go away or get better with time.      What happens if I miss a dose? If you miss a dose, take it as soon as you remember. If it is close to your next dose, skip it (do not take 2 doses at once).       Medication Safety   What are things I should warn my doctor immediately about? Tell your  doctor if you have kidney disease, heart failure, or pancreas problems. Talk to your doctor if you are pregnant, planning to become pregnant, or breastfeeding. Also tell your doctor if you notice any signs/symptoms of an allergic reaction (rash, hives, difficulty breathing, etc.) or blisters on your skin.     What are things that I should be aware of? Be cautious of any side effects from this medication. Talk to your doctor if any new ones develop or aren't getting better.     What are some medications that can interact with this one? Some common medications that interact include other medications that may also lower your blood sugar such as insulins and glipizide/glimepiride/glyburide. Your doctor may reduce the dose of these medications when you start Januvia to minimize low blood sugars. Always tell your doctor or pharmacist immediately if you start taking any new medications, including over-the-counter medications, vitamins, and herbal supplements.        Medication Storage/Handling   How should I handle this medication? Keep this medication out of reach of pets/children in tightly sealed container   How does this medication need to be stored? Store at room temperature and keep dry (don't keep in bathroom or other room with moisture)   How should I dispose of this medication? There should not be a need to dispose of this medication unless your provider decides to change the dose or therapy. If that is the case, take to your local police station for proper disposal. Some pharmacies also have take-back bins for medication drop-off.       Resources/Support   How can I remind myself to take this medication? You can download reminder apps to help you manage your refills. You may also set an alarm on your phone to remind you. The pharmacy carries pill boxes that you can place next to an area you pass everyday (such as where you place your car keys or where you charge your phone)   Is financial support available?   [employer] can provide co-pay cards if you have commercial insurance or patient assistance if you have Medicare or no insurance.    Which vaccines are recommended for me? Talk to your doctor about these vaccines: Flu, Coronavirus (COVID-19), Pneumococcal (pneumonia), Tdap, Hepatitis B, Zoster (shingles)       Adherence and Self-Administration  Adherence related to the patient's specialty therapy was discussed with the patient. The Adherence segment of this outreach has been reviewed and updated.     Is there a concern with patient's ability to self administer the medication correctly and without issue?: No  Were any potential barriers to adherence identified during the initial assessment or patient education?: No  Are there any concerns regarding the patient's understanding of the importance of medication adherence?: No  Methods for Supporting Patient Adherence and/or Self-Administration: I spoke to the patient in person during his office visit and we discussed the medication profile for each medication.    Open Medication Therapy Problems  No medication therapy recommendations to display    Goals of Therapy  Goals related to the patient's specialty therapy were discussed with the patient. The Patient Goals segment of this outreach has been reviewed and updated.   Goals Addressed Today        Specialty Pharmacy General Goal      A1C < 7 %     Lab Results    Component Value Date Notes    HGBA1C 8.2 (A) 10/28/2024 New enrollment. A1C slightly increased. Plan is to continue current medication regimen and work on diet and exercise. Dr. Harris is also checking to see if he is potentially type 1 instead of type 2 DM. NB    HGBA1C 8.1 04/25/2024                  Reassessment Plan & Follow-Up  1. Medication Therapy Changes: No Changes  2. Related Plans, Therapy Recommendations, or Therapy Problems to Be Addressed: Continue current medication regimen and work on diet and exercise. Dr. Harris is also checking to see if the patient  is potentially type 1 instead of type 2 DM. Follow A1C during office visits.  3. Pharmacist to perform regular assessments no more than (6) months from the previous assessment.  4. Care Coordinator to set up future refill outreaches, coordinate prescription delivery, and escalate clinical questions to pharmacist.  5. Welcome information and patient satisfaction survey to be sent by specialty pharmacy team with patient's initial fill.    Attestation  Therapeutic appropriateness: Appropriate   I attest the patient was actively involved in and has agreed to the above plan of care. If the prescribed therapy is at any point deemed not appropriate based on the current or future assessments, a consultation will be initiated with the patient's specialty care provider to determine the best course of action. The revised plan of therapy will be documented along with any required assessments and/or additional patient education provided.     Arvind Shannon, PharmD  Clinical Specialty Pharmacist, Endocrinology  10/29/2024  08:43 EDT    Discussed the aforementioned information with the patient via In-Person.

## 2024-11-01 LAB — GAD65 AB SER IA-ACNC: <5 U/ML (ref 0–5)

## 2024-11-05 ENCOUNTER — SPECIALTY PHARMACY (OUTPATIENT)
Dept: GENERAL RADIOLOGY | Facility: HOSPITAL | Age: 64
End: 2024-11-05
Payer: MEDICAID

## 2024-11-05 RX ORDER — PEN NEEDLE, DIABETIC 32 GX 1/4"
1 NEEDLE, DISPOSABLE MISCELLANEOUS DAILY
Qty: 100 EACH | Refills: 3 | Status: SHIPPED | OUTPATIENT
Start: 2024-11-05

## 2024-11-05 NOTE — PROGRESS NOTES
Specialty Pharmacy Patient Management Program  Per Protocol Prescription Order/Refill     Patient currently fills medications at [facility] and is enrolled in an Endocrinology Patient Management Program.     Requested Prescriptions     Pending Prescriptions Disp Refills    BD Pen Needle Micro U/F 32G X 6 MM misc 100 each 3     Sig: Inject 1 Needle under the skin into the appropriate area as directed Daily.     Prescription orders above were sent to the pharmacy per Collaborative Care Agreement Protocol.     Arvind Shannon, PharmD  Clinical Specialty Pharmacist, Endocrinology  11/5/2024  07:50 EST

## 2024-11-11 ENCOUNTER — SPECIALTY PHARMACY (OUTPATIENT)
Age: 64
End: 2024-11-11
Payer: MEDICAID

## 2024-12-30 ENCOUNTER — SPECIALTY PHARMACY (OUTPATIENT)
Dept: GENERAL RADIOLOGY | Facility: HOSPITAL | Age: 64
End: 2024-12-30
Payer: MEDICAID

## 2024-12-30 NOTE — PROGRESS NOTES
Specialty Pharmacy Patient Management Program  Endocrinology Refill Outreach      Tk is a 64 y.o. male contacted today regarding refills of his medication(s).    Specialty medication(s) and dose(s) confirmed: jardiance 10mg daily    Refill Questions      Flowsheet Row Most Recent Value   Changes to allergies? No   Changes to medications? No   New conditions or infections since last clinic visit No   Unplanned office visit, urgent care, ED, or hospital admission in the last 4 weeks  No   How does patient/caregiver feel medication is working? Excellent   Financial problems or insurance changes  No   Since the previous refill, were any specialty medication doses or scheduled injections missed or delayed?  No   Does this patient require a clinical escalation to a pharmacist? No          Delivery Questions      Flowsheet Row Most Recent Value   Delivery method UPS   Delivery address verified with patient/caregiver? Yes   Delivery address Home   Number of medications in delivery 1   Medication(s) being filled and delivered Empagliflozin (Jardiance)   Doses left of specialty medications 3   Copay verified? Yes   Copay amount $0   Copay form of payment No copayment ($0)   Ship Date 12/30/24   Delivery Date 12/31/24   Signature Required Yes            Follow-Up: 90 days  Arvind Shannon, BlossomD  Clinical Specialty Pharmacist, Endocrinology  12/30/2024  12:05 EST

## 2025-01-20 ENCOUNTER — SPECIALTY PHARMACY (OUTPATIENT)
Age: 65
End: 2025-01-20
Payer: MEDICAID

## 2025-01-20 NOTE — PROGRESS NOTES
Specialty Pharmacy Patient Management Program  Endocrinology Refill Outreach      Tk is a 64 y.o. male contacted today regarding refills of his medication(s).    Specialty medication(s) and dose(s) confirmed: Januvia.    Refill Questions      Flowsheet Row Most Recent Value   Changes to allergies? No   Changes to medications? No   New conditions or infections since last clinic visit No   Unplanned office visit, urgent care, ED, or hospital admission in the last 4 weeks  No   How does patient/caregiver feel medication is working? Very good   Financial problems or insurance changes  No   Since the previous refill, were any specialty medication doses or scheduled injections missed or delayed?  No   Does this patient require a clinical escalation to a pharmacist? No          Delivery Questions      Flowsheet Row Most Recent Value   Delivery method UPS   Delivery address verified with patient/caregiver? Yes   Delivery address Home   Number of medications in delivery 2   Medication(s) being filled and delivered Empagliflozin (Jardiance), Insulin Detemir (Levemir FlexPen)   Doses left of specialty medications 1   Copay verified? Yes   Copay amount $0   Copay form of payment No copayment ($0)   Ship Date 1/21   Delivery Date 1/22   Signature Required Yes            Follow-Up: trisha Resendiz CPhT  Pharmacy Care Coordinator, Endocrinology  1/20/2025  11:41 EST

## 2025-02-04 ENCOUNTER — SPECIALTY PHARMACY (OUTPATIENT)
Age: 65
End: 2025-02-04
Payer: MEDICAID

## 2025-03-24 ENCOUNTER — SPECIALTY PHARMACY (OUTPATIENT)
Age: 65
End: 2025-03-24
Payer: MEDICAID

## 2025-03-24 NOTE — PROGRESS NOTES
Specialty Pharmacy Patient Management Program  Endocrinology Refill Outreach      Tk is a 64 y.o. male contacted today regarding refills of his medication(s).    Specialty medication(s) and dose(s) confirmed: Jardiance.    Refill Questions      Flowsheet Row Most Recent Value   Changes to allergies? No   Changes to medications? No   New conditions or infections since last clinic visit No   Unplanned office visit, urgent care, ED, or hospital admission in the last 4 weeks  No   How does patient/caregiver feel medication is working? Very good   Financial problems or insurance changes  No   Since the previous refill, were any specialty medication doses or scheduled injections missed or delayed?  No   Does this patient require a clinical escalation to a pharmacist? No          Delivery Questions      Flowsheet Row Most Recent Value   Delivery method UPS   Delivery address verified with patient/caregiver? Yes   Delivery address Home   Number of medications in delivery 1   Medication(s) being filled and delivered Empagliflozin (Jardiance)   Doses left of specialty medications 1   Copay verified? Yes   Copay amount $12.15   Copay form of payment Credit/debit on file   Delivery Date Selection 03/25/25   Signature Required No            Follow-Up: 30d    Carlos Resendiz CPhT  Pharmacy Care Coordinator, Endocrinology  3/24/2025  11:47 EDT

## 2025-04-16 ENCOUNTER — TELEPHONE (OUTPATIENT)
Age: 65
End: 2025-04-16

## 2025-04-16 NOTE — TELEPHONE ENCOUNTER
Caller: Tk Penaloza    Relationship to patient: Self    Best call back number: [phone] (Mobile)     Patient is needing: PT MISSED A CALL FROM GEOVANI

## 2025-04-18 ENCOUNTER — SPECIALTY PHARMACY (OUTPATIENT)
Dept: GENERAL RADIOLOGY | Facility: HOSPITAL | Age: 65
End: 2025-04-18
Payer: MEDICAID

## 2025-04-18 ENCOUNTER — SPECIALTY PHARMACY (OUTPATIENT)
Age: 65
End: 2025-04-18
Payer: MEDICAID

## 2025-04-18 NOTE — PROGRESS NOTES
Specialty Pharmacy Patient Management Program  Endocrinology Refill Outreach      Tk is a 65 y.o. male contacted today regarding refills of his medication(s).    Specialty medication(s) and dose(s) confirmed: Jardiance.    Refill Questions      Flowsheet Row Most Recent Value   Changes to allergies? No   Changes to medications? No   New conditions or infections since last clinic visit No   Unplanned office visit, urgent care, ED, or hospital admission in the last 4 weeks  No   How does patient/caregiver feel medication is working? Very good   Financial problems or insurance changes  No   Since the previous refill, were any specialty medication doses or scheduled injections missed or delayed?  No   Does this patient require a clinical escalation to a pharmacist? No          Delivery Questions      Flowsheet Row Most Recent Value   Delivery method UPS   Delivery address verified with patient/caregiver? Yes   Delivery address Home   Number of medications in delivery 1   Medication(s) being filled and delivered Empagliflozin (Jardiance)   Doses left of specialty medications 1   Copay verified? Yes   Copay amount $12.15   Copay form of payment Credit/debit on file   Delivery Date Selection 04/22/25   Signature Required No            Follow-Up: 30d    Carlos Resendiz CPhT  Pharmacy Care Coordinator, Endocrinology  4/18/2025  09:28 EDT

## 2025-04-18 NOTE — PROGRESS NOTES
Specialty Pharmacy Patient Management Program  Endocrinology Reassessment     Tk Penaloza was referred by an Endocrinology provider to the Endocrinology Patient Management program offered by [facility] for Type 2 Diabetes. A follow-up outreach was conducted, including assessment of continued therapy appropriateness, medication adherence, and side effect incidence and management for Januvia and Jardiance.    Changes to Insurance Coverage or Financial Support  No changes    Relevant Past Medical History and Comorbidities  Relevant medical history and concomitant health conditions were discussed with the patient. The patient's chart has been reviewed for relevant past medical history and comorbid health conditions and updated as necessary.   Past Medical History:   Diagnosis Date    Hyperlipidemia     Hypertension     Type 2 diabetes mellitus      Social History     Socioeconomic History    Marital status:    Tobacco Use    Smoking status: Never    Smokeless tobacco: Never   Vaping Use    Vaping status: Never Used   Substance and Sexual Activity    Alcohol use: Not Currently     Comment: rarely    Drug use: Never    Sexual activity: Yes     Partners: Female     Birth control/protection: None     Problem list reviewed by Ze Shannon RPH on 4/18/2025 at  2:08 PM    Hospitalizations and Urgent Care Since Last Assessment  ED Visits, Admissions, or Hospitalizations: none reported  Urgent Office Visits: none rported    Allergies  Known allergies and reactions were discussed with the patient. The patient's chart has been reviewed for allergy information and updated as necessary.   No Known Allergies  Allergies reviewed by Ze Shannon RPH on 4/18/2025 at  2:08 PM    Relevant Laboratory Values  Relevant laboratory values were discussed with the patient. The following specialty medication dose adjustment(s) are recommended: no changes  A1C Last 3 Results          4/25/2024     "15:07 10/28/2024    15:02   HGBA1C Last 3 Results   Hemoglobin A1C 8.1  8.2      Lab Results   Component Value Date    HGBA1C 8.2 (A) 10/28/2024     No results found for: \"GLUCOSE\", \"CALCIUM\", \"NA\", \"K\", \"CO2\", \"CL\", \"BUN\", \"CREATININE\", \"EGFRIFAFRI\", \"EGFRIFNONA\", \"BCR\", \"ANIONGAP\"  No results found for: \"CHOL\", \"CHLPL\", \"TRIG\", \"HDL\", \"LDL\", \"LDLDIRECT\"  Microalbumin          10/28/2024    16:22   Microalbumin   Microalbumin, Urine 3.4      Current Medication List  This medication list has been reviewed with the patient and evaluated for any interactions or necessary modifications/recommendations, and updated to include all prescription medications, OTC medications, and supplements the patient is currently taking.  This list reflects what is contained in the patient's profile, which has also been marked as reviewed to communicate to other providers it is the most up to date version of the patient's current medication therapy.     Current Outpatient Medications:     aspirin 81 MG chewable tablet, Chew 1 tablet Daily., Disp: , Rfl:     BD Pen Needle Micro U/F 32G X 6 MM misc, Inject 1 Needle under the skin into the appropriate area as directed Daily., Disp: 100 each, Rfl: 3    buPROPion XL (WELLBUTRIN XL) 150 MG 24 hr tablet, , Disp: , Rfl:     carvedilol (COREG) 25 MG tablet, 2 (Two) Times a Day., Disp: , Rfl:     clopidogrel (PLAVIX) 75 MG tablet, Take 1 tablet by mouth Daily., Disp: , Rfl:     Continuous Blood Gluc Sensor (FreeStyle Bertin 3 Sensor) misc, 1 each Every 14 (Fourteen) Days., Disp: 2 each, Rfl: 11    FREESTYLE LITE test strip, Daily., Disp: , Rfl:     insulin detemir (Levemir FlexPen) 100 UNIT/ML injection, Inject 80 Units under the skin into the appropriate area as directed Daily. (Max 100 units daily), Disp: 90 mL, Rfl: 3    Jardiance 10 MG tablet tablet, Take 1 tablet by mouth Daily., Disp: 90 tablet, Rfl: 3    Lancets (freestyle) lancets, Daily., Disp: , Rfl:     meloxicam (MOBIC) 15 MG tablet, " Daily., Disp: , Rfl:     metFORMIN ER (GLUCOPHAGE-XR) 500 MG 24 hr tablet, Take 2 tablets by mouth Daily With Breakfast., Disp: 180 tablet, Rfl: 3    multivitamin with minerals tablet tablet, Take 1 tablet by mouth Daily., Disp: , Rfl:     Praluent 150 MG/ML injection pen, , Disp: , Rfl:     repaglinide (PRANDIN) 2 MG tablet, Take 1 tablet by mouth 3 (Three) Times a Day Before Meals., Disp: 270 tablet, Rfl: 3    SITagliptin (Januvia) 100 MG tablet, Take 1 tablet by mouth Daily., Disp: 90 tablet, Rfl: 3    valsartan-hydrochlorothiazide (DIOVAN-HCT) 320-25 MG per tablet, Daily., Disp: , Rfl:     vitamin B-12 (CYANOCOBALAMIN) 1000 MCG tablet, Take  by mouth Daily. 2500mg daily, Disp: , Rfl:     Medicines reviewed by Ze Shannon [facility] on 4/18/2025 at  2:08 PM    Drug Interactions  No Clinically Significant DDIs Were Identified at Present Time Upon Marking Medications Reviewed    Recommended Medications Assessment  Aspirin: Currently Taking   Statin: Not Taking Currently  ACEi/ARB: Currently Taking     Adverse Drug Reactions  Medication tolerability: Tolerating with no to minimal ADRs  Medication plan: Continue therapy with normal follow-up  Plan for ADR Management: n/a    Adherence, Self-Administration, and Current Therapy Problems  Adherence related to the patient's specialty therapy was discussed with the patient. The Adherence segment of this outreach has been reviewed and updated.     Adherence Questions  Linked Medication(s) Assessed: Empagliflozin (Jardiance), SITagliptin Phosphate (JANUVIA)  On average, how many doses/injections does the patient miss per month?: 0  What are the identified reasons for non-adherence or missed doses? : no problems identified  What is the estimated medication adherence level?: (S) % (Both jardiance and januvia PDC falsely low. Since enrolling in our pharmacy program 10/28/2024 he has filled both medication 3 times and they've all been filled on time. Patient reports no  missed doses.)  Based on the patient/caregiver response and refill history, does this patient require an MTP to track adherence improvements?: no    Additional Barriers to Patient Self-Administration: none identified  Methods for Supporting Patient Self-Administration: n/a    Open Medication Therapy Problems  No medication therapy recommendations to display    Goals of Therapy  Goals related to the patient's specialty therapy were discussed with the patient. The Patient Goals segment of this outreach has been reviewed and updated.   Goals Addressed Today        Specialty Pharmacy General Goal      A1C < 7 %     Lab Results    Component Value Date Notes      04/18/2025 Phone reassessment. No new lab values. Patient doing well on medication regimen and reports no missed doses. NB    HGBA1C 8.2 (A) 10/28/2024 New enrollment. A1C slightly increased. Plan is to continue current medication regimen and work on diet and exercise. Dr. Harris is also checking to see if he is potentially type 1 instead of type 2 DM. NB    HGBA1C 8.1 04/25/2024                    Quality of Life Assessment   Quality of Life related to the patient's enrollment in the patient management program and services provided was discussed with the patient. The QOL segment of this outreach has been reviewed and updated.  Quality of Life Improvement Scale: 9-A good deal better    Reassessment Plan & Follow-Up  1. Medication Therapy Changes: no changes  2. Related Plans, Therapy Recommendations, or Issues to Be Addressed: follow A1c during office visits next on 4/28/2025.  3. Pharmacist to perform regular assessments no more than (6) months from the previous assessment.  4. Care Coordinator to set up future refill outreaches, coordinate prescription delivery, and escalate clinical questions to pharmacist.    Attestation  Therapeutic appropriateness: Appropriate   I attest the patient was actively involved in and has agreed to the above plan of care.  If the  prescribed therapy is at any point deemed not appropriate based on the current or future assessments, a consultation will be initiated with the patient's specialty care provider to determine the best course of action. The revised plan of therapy will be documented along with any required assessments and/or additional patient education provided.     Arvind Shnanon, Marcos  Clinical Specialty Pharmacist, Endocrinology  4/18/2025  14:11 EDT    Discussed the aforementioned information with the patient via Telephone.

## 2025-04-22 ENCOUNTER — SPECIALTY PHARMACY (OUTPATIENT)
Age: 65
End: 2025-04-22
Payer: MEDICAID

## 2025-04-22 NOTE — PROGRESS NOTES
Specialty Pharmacy Patient Management Program  Endocrinology Refill Outreach      Tk is a 65 y.o. male contacted today regarding refills of his medication(s).    Specialty medication(s) and dose(s) confirmed: Januvia.    Refill Questions      Flowsheet Row Most Recent Value   Changes to allergies? No   Changes to medications? No   New conditions or infections since last clinic visit No   Unplanned office visit, urgent care, ED, or hospital admission in the last 4 weeks  No   How does patient/caregiver feel medication is working? Very good   Financial problems or insurance changes  No   Since the previous refill, were any specialty medication doses or scheduled injections missed or delayed?  No   Does this patient require a clinical escalation to a pharmacist? No          Delivery Questions      Flowsheet Row Most Recent Value   Delivery method UPS   Delivery address verified with patient/caregiver? Yes   Delivery address Home   Number of medications in delivery 2   Medication(s) being filled and delivered Insulin Detemir (Levemir FlexPen), SITagliptin Phosphate (JANUVIA)   Doses left of specialty medications 1   Copay verified? Yes   Copay amount $24.30   Copay form of payment Credit/debit on file   Delivery Date Selection 04/23/25   Signature Required No   Do you consent to receive electronic handouts?  Yes            Follow-Up: 30d    Carlos Resendiz CPhT  Pharmacy Care Coordinator, Endocrinology  4/22/2025  09:31 EDT

## 2025-04-28 ENCOUNTER — OFFICE VISIT (OUTPATIENT)
Age: 65
End: 2025-04-28
Payer: MEDICARE

## 2025-04-28 VITALS
BODY MASS INDEX: 27.92 KG/M2 | OXYGEN SATURATION: 99 % | HEART RATE: 74 BPM | HEIGHT: 70 IN | WEIGHT: 195 LBS | SYSTOLIC BLOOD PRESSURE: 126 MMHG | DIASTOLIC BLOOD PRESSURE: 78 MMHG

## 2025-04-28 DIAGNOSIS — E11.65 UNCONTROLLED TYPE 2 DIABETES MELLITUS WITH HYPERGLYCEMIA: Primary | ICD-10-CM

## 2025-04-28 LAB
EXPIRATION DATE: ABNORMAL
EXPIRATION DATE: ABNORMAL
GLUCOSE BLDC GLUCOMTR-MCNC: 378 MG/DL (ref 70–130)
HBA1C MFR BLD: 7.6 % (ref 4.5–5.7)
Lab: ABNORMAL
Lab: ABNORMAL

## 2025-04-28 PROCEDURE — 1159F MED LIST DOCD IN RCRD: CPT | Performed by: INTERNAL MEDICINE

## 2025-04-28 PROCEDURE — 1160F RVW MEDS BY RX/DR IN RCRD: CPT | Performed by: INTERNAL MEDICINE

## 2025-04-28 PROCEDURE — 83036 HEMOGLOBIN GLYCOSYLATED A1C: CPT | Performed by: INTERNAL MEDICINE

## 2025-04-28 PROCEDURE — 3051F HG A1C>EQUAL 7.0%<8.0%: CPT | Performed by: INTERNAL MEDICINE

## 2025-04-28 PROCEDURE — 82947 ASSAY GLUCOSE BLOOD QUANT: CPT | Performed by: INTERNAL MEDICINE

## 2025-04-28 PROCEDURE — 99213 OFFICE O/P EST LOW 20 MIN: CPT | Performed by: INTERNAL MEDICINE

## 2025-04-28 NOTE — ASSESSMENT & PLAN NOTE
Eyes, kidneys and foot checks are up to date  A1c above goal but improved.  Plan : no changes. Will have to change insulins since they are not making levemir any longer

## 2025-04-28 NOTE — PROGRESS NOTES
"     Office Note      Date: 2025  Patient Name: Tk Penaloza  MRN: [id number]  : 1960    Chief Complaint   Patient presents with    Diabetes       History of Present Illness:   Tk Penaloza is a 65 y.o. male who presents for Diabetes type 2.   Current RX orals + insulin     Bg checks are done: daily   Hypoglycemia : rare.       Last A1c:  Hemoglobin A1C   Date Value Ref Range Status   2025 7.6 (A) 4.5 - 5.7 % Final       Changes in health since last visit: none . Last eye exam  up to date.    Subjective              Review of Systems:   Review of Systems   Endocrine: Negative for polydipsia and polyuria.       The following portions of the patient's history were reviewed and updated as appropriate: allergies, current medications, past family history, past medical history, past social history, past surgical history, and problem list.    Objective     Visit Vitals  /78 (BP Location: Right arm, Patient Position: Sitting, Cuff Size: Adult)   Pulse 74   Ht 177.8 cm (70\")   Wt 88.5 kg (195 lb)   SpO2 99%   BMI 27.98 kg/m²           Physical Exam:  Physical Exam  Vitals reviewed.   Constitutional:       Appearance: Normal appearance.   Neurological:      Mental Status: He is alert.   Psychiatric:         Mood and Affect: Mood normal.         Behavior: Behavior normal.         Thought Content: Thought content normal.         Judgment: Judgment normal.          Assessment / Plan      Assessment & Plan:  Problem List Items Addressed This Visit       Uncontrolled type 2 diabetes mellitus with hyperglycemia - Primary    Overview   Intolerant of trulicity   Gada negative         Current Assessment & Plan   Eyes, kidneys and foot checks are up to date  A1c above goal but improved.  Plan : no changes. Will have to change insulins since they are not making levemir any longer          Relevant Medications    Continuous Blood Gluc Sensor (FreeStyle Bertin 3 Sensor) misc    SITagliptin (Januvia) 100 MG tablet    " Jardiance 10 MG tablet tablet    metFORMIN ER (GLUCOPHAGE-XR) 500 MG 24 hr tablet    repaglinide (PRANDIN) 2 MG tablet    insulin detemir (Levemir FlexPen) 100 UNIT/ML injection    Other Relevant Orders    POC Glucose, Blood (Completed)    POC Glycosylated Hemoglobin (Hb A1C) (Completed)         Electronically signed by : Dipak Harris MD  04/28/2025

## 2025-04-29 ENCOUNTER — SPECIALTY PHARMACY (OUTPATIENT)
Dept: GENERAL RADIOLOGY | Facility: HOSPITAL | Age: 65
End: 2025-04-29
Payer: MEDICARE

## 2025-04-29 RX ORDER — INSULIN GLARGINE 100 [IU]/ML
60 INJECTION, SOLUTION SUBCUTANEOUS DAILY
Qty: 45 ML | Refills: 3 | Status: SHIPPED | OUTPATIENT
Start: 2025-04-29

## 2025-04-29 NOTE — PROGRESS NOTES
Specialty Pharmacy Patient Management Program  Per Protocol Prescription Order/Refill     Patient currently fills medications at [facility] and is enrolled in an Endocrinology Patient Management Program. Switching from levemir to lantus formulary change. Dr Harris wanted to reduce dose to 60 units     Requested Prescriptions     Pending Prescriptions Disp Refills    Insulin Glargine (Lantus SoloStar) 100 UNIT/ML injection pen 45 mL 3     Sig: Inject 60 Units under the skin into the appropriate area as directed Daily. (Max daily dose 80 units)     Prescription orders above were sent to the pharmacy per Collaborative Care Agreement Protocol.     Arvind Shannon, PharmD  Clinical Specialty Pharmacist, Endocrinology  4/29/2025  08:13 EDT

## 2025-04-29 NOTE — PROGRESS NOTES
Specialty Pharmacy Patient Management Program  Endocrinology Refill Outreach      Tk is a 65 y.o. male contacted today regarding refills of his medication(s).    Specialty medication(s) and dose(s) confirmed: lantus 60 units daily    Refill Questions      Flowsheet Row Most Recent Value   Changes to allergies? No   Changes to medications? Yes   [switch from levemir to lantus formualry change RPH and patient aware of change]   New conditions or infections since last clinic visit No   Unplanned office visit, urgent care, ED, or hospital admission in the last 4 weeks  No   How does patient/caregiver feel medication is working? Very good   Financial problems or insurance changes  No   Since the previous refill, were any specialty medication doses or scheduled injections missed or delayed?  No          Delivery Questions      Flowsheet Row Most Recent Value   Delivery method UPS   Delivery address verified with patient/caregiver? Yes   Delivery address Home   Number of medications in delivery 4   Medication(s) being filled and delivered Repaglinide (PRANDIN), Insulin Glargine (Lantus SoloStar), Insulin Pen Needle (BD Pen Needle Micro U/F), metFORMIN HCl (GLUCOPHAGE-XR)   Doses left of specialty medications lantus new start   Copay verified? Yes   Copay amount $28.66   Copay form of payment Credit/debit on file   Delivery Date Selection 04/30/25   Signature Required No            Follow-Up: 30 days  Arvind Shannon, BlossomD  Clinical Specialty Pharmacist, Endocrinology  4/29/2025  08:38 EDT

## 2025-04-29 NOTE — PROGRESS NOTES
Specialty Pharmacy Patient Management Program  Endocrinology Medication Addition Assessment     Tk Penaloza was referred by an Endocrinology provider to the Endocrinology Patient Management Program offered by [facility] for Type 2 Diabetes. This assessment was conducted as a result of a specialty medication addition or substitution. The patient was previously introduced to services offered by [facility], including: regular assessments, refill coordination, curbside pick-up or mail order delivery options, prior authorization maintenance, and financial assistance programs as applicable. The patient was also provided with contact information for the pharmacy team.      An initial outreach was conducted, including assessment of therapy appropriateness and specialty medication education for Insulin Glargine.     A follow-up outreach was conducted, including assessment of continued therapy appropriateness, medication adherence, and side effect incidence and management for Januvia and Jardiance.    Insurance Coverage & Financial Support  No changes     Relevant Past Medical History and Comorbidities  Relevant medical history and concomitant health conditions were discussed with the patient. The patient's chart has been reviewed for relevant past medical history and comorbid conditions and updated as necessary.  Past Medical History:   Diagnosis Date    Hyperlipidemia     Hypertension     Type 2 diabetes mellitus      Social History     Socioeconomic History    Marital status:    Tobacco Use    Smoking status: Never    Smokeless tobacco: Never   Vaping Use    Vaping status: Never Used   Substance and Sexual Activity    Alcohol use: Not Currently     Comment: rarely    Drug use: Never    Sexual activity: Yes     Partners: Female     Birth control/protection: None       Problem list reviewed by Ze Shannon [facility] on 4/29/2025 at  8:32 AM    Hospitalizations and  "Urgent Care Since Last Assessment  ED Visits, Admissions, or Hospitalizations: none reported  Urgent Office Visits: none reported    Allergies  Known allergies and reactions were discussed with the patient. The patient's chart has been reviewed for  allergy information and updated as necessary.   No Known Allergies    Allergies reviewed by Ze Shannon [facility] on 4/29/2025 at  8:31 AM    Relevant Laboratory Values  Relevant laboratory values were discussed with the patient. The following specialty medication dose adjustment(s) are recommended: Switch from levemir to lantus d/t insurance formulary change  A1C Last 3 Results          10/28/2024    15:02 4/28/2025    15:54   HGBA1C Last 3 Results   Hemoglobin A1C 8.2  7.6      Lab Results   Component Value Date    HGBA1C 7.6 (A) 04/28/2025     No results found for: \"GLUCOSE\", \"CALCIUM\", \"NA\", \"K\", \"CO2\", \"CL\", \"BUN\", \"CREATININE\", \"EGFRIFAFRI\", \"EGFRIFNONA\", \"BCR\", \"ANIONGAP\"  No results found for: \"CHOL\", \"CHLPL\", \"TRIG\", \"HDL\", \"LDL\", \"LDLDIRECT\"  Microalbumin          10/28/2024    16:22   Microalbumin   Microalbumin, Urine 3.4        Current Medication List  This medication list has been reviewed with the patient and evaluated for any interactions or necessary modifications/recommendations, and updated to include all prescription medications, OTC medications, and supplements the patient is currently taking.  This list reflects what is contained in the patient's profile, which has also been marked as reviewed to communicate to other providers it is the most up to date version of the patient's current medication therapy.     Current Outpatient Medications:     aspirin 81 MG chewable tablet, Chew 1 tablet Daily., Disp: , Rfl:     BD Pen Needle Micro U/F 32G X 6 MM misc, Inject 1 Needle under the skin into the appropriate area as directed Daily., Disp: 100 each, Rfl: 3    buPROPion XL (WELLBUTRIN XL) 150 MG 24 hr tablet, , Disp: , Rfl:     carvedilol (COREG) 25 " MG tablet, 2 (Two) Times a Day., Disp: , Rfl:     clopidogrel (PLAVIX) 75 MG tablet, Take 1 tablet by mouth Daily., Disp: , Rfl:     Continuous Blood Gluc Sensor (FreeStyle Bertin 3 Sensor) misc, 1 each Every 14 (Fourteen) Days., Disp: 2 each, Rfl: 11    FREESTYLE LITE test strip, Daily., Disp: , Rfl:     Insulin Glargine (Lantus SoloStar) 100 UNIT/ML injection pen, Inject 60 Units under the skin into the appropriate area as directed Daily. (Max daily dose 80 units), Disp: 45 mL, Rfl: 3    Jardiance 10 MG tablet tablet, Take 1 tablet by mouth Daily., Disp: 90 tablet, Rfl: 3    Lancets (freestyle) lancets, Daily., Disp: , Rfl:     meloxicam (MOBIC) 15 MG tablet, Daily., Disp: , Rfl:     metFORMIN ER (GLUCOPHAGE-XR) 500 MG 24 hr tablet, Take 2 tablets by mouth Daily With Breakfast., Disp: 180 tablet, Rfl: 3    multivitamin with minerals tablet tablet, Take 1 tablet by mouth Daily., Disp: , Rfl:     Praluent 150 MG/ML injection pen, , Disp: , Rfl:     repaglinide (PRANDIN) 2 MG tablet, Take 1 tablet by mouth 3 (Three) Times a Day Before Meals., Disp: 270 tablet, Rfl: 3    SITagliptin (Januvia) 100 MG tablet, Take 1 tablet by mouth Daily., Disp: 90 tablet, Rfl: 3    valsartan-hydrochlorothiazide (DIOVAN-HCT) 320-25 MG per tablet, Daily., Disp: , Rfl:     vitamin B-12 (CYANOCOBALAMIN) 1000 MCG tablet, Take  by mouth Daily. 2500mg daily, Disp: , Rfl:     Medicines reviewed by Ze Shannon [facility] on 4/29/2025 at  8:32 AM    Drug Interactions  No Clinically Significant DDIs Were Identified at Present Time Upon Marking Medications Reviewed      Recommended Medications Assessment  Aspirin: Currently Taking   Statin: Not Taking Currently  ACEi/ARB: Currently Taking     Initial Education Provided for Specialty Medication  The patient has been provided with the following education and any applicable administration techniques (i.e. self-injection) have been demonstrated for the therapies indicated. All questions and  concerns have been addressed prior to the patient receiving the medication, and the patient has verbalized comprehension of the education and any materials provided. Additional patient education shall be provided and documented upon request by the patient, provider, or payer.  insulin glargine  Medication Expectations    Why am I taking this medication?  You are taking this medication to lower blood sugar and A1c because you have type 1 or type 2 diabetes. Diabetes is not curable but with proper medication and treatment, we can keep your blood sugar within your personalized target range.    What should I expect while on this medication?  You should expect to see your blood sugar and A1c decrease over time.    How does the medication work?  Insulin glargine (Basaglar, Lantus, Semglee, etc.) is a long-acting insulin that releases slowly and continuously in the body. Insulin helps the sugar in your blood get into cells and provide them with energy to fuel your body.     How long will I be on this medication for?  If you have Type 1 diabetes, you will be on this medication or another insulin throughout your lifetime because your body cannot make its own insulin. If you have Type 2 diabetes, the amount of time you will be on this medication will be determined by your doctor based on blood sugar and A1c control. You will most likely be on this medication or another diabetes medication throughout your lifetime because your body does not make enough insulin. Do not abruptly stop this medication without talking to your doctor first.     How do I take this medication?  Take as directed on your prescription label. Insulin glargine is usually given once or twice daily and can be taken with or without food. Insulin glargine  is injected under the skin (subcutaneously) of your stomach, thigh, buttocks, or upper arm. Use a different injection site in the same body region each day.     What are some possible side effects?  Insulin  glargine  may cause low blood sugar (hypoglycemia). Signs and symptoms that may indicate hypoglycemia include dizziness, sweating, anxiety, irritability, confusion, or headache.    What happens if I miss a dose?  If you miss a dose, take it as soon as you remember. If it is close to your next dose, skip it. Never take 2 doses at once.       Medication Safety    What are things I should warn my doctor immediately about?  Tell your doctor if you have kidney or liver problems. Talk to your doctor if you are pregnant, planning to become pregnant, or breastfeeding. Also tell your doctor if you notice any signs/symptoms of an allergic reaction (rash, hives, difficulty breathing, etc.).    What are things that I should be cautious of?  Be cautious of any side effects from this medication. Talk to your doctor if any new ones develop or aren't getting better.    What are some medications that can interact with this one?  Do not take this medication with rosiglitazone or macimorelin. Taking insulin glargine with other medications that lower your blood sugar may increase the risk of hypoglycemia. Your doctor may reduce the dose of these medications when you start insulin glargine  Always tell your doctor or pharmacist immediately if you start taking any new medications, including over-the-counter medications, vitamins, and herbal supplements.        Medication Storage/Handling    How should I handle this medication?  Keep this medication out of reach of pets/children and keep the pen capped when not in use. Do not share your medicine with others.     How does this medication need to be stored?  Store your new, unused pens in the original carton in the refrigerator. Protect it from light. Do not freeze. Always remove the needle and place the cap on the pen before storing.     How should I dispose of this medication?  Used insulin glargine  pens should be thrown away after 28 days even if insulin remains.?Place your used pen and  needle in an approved sharps container?If your doctor decides to stop this medication, take to your local police station for proper disposal. Some pharmacies also have?take-back bins for medication drop-off.??       Resources/Support    How can I remind myself to take this medication?  You can download reminder apps to help you manage your refills or set an alarm on your phone to remind you.     Is financial support available?   Manufacturers of insulin glargine  can provide co-pay cards if you have commercial insurance or patient assistance if you have Medicare or no insurance.     Which vaccines are recommended for me?  Talk to your doctor about these vaccines: Flu, Coronavirus (COVID-19), Pneumococcal (pneumonia), Tdap, Hepatitis B, Zoster (shingles)           Adherence, Self-Administration, and Current Therapy Problems  Adherence related to the patient's specialty therapy was discussed with the patient. The Adherence segment of this outreach has been reviewed and updated.     Is there a concern with patient's ability to self administer the medication correctly and without issue?: No  Were any potential barriers to adherence identified during the initial assessment or patient education?: No  Are there any concerns regarding the patient's understanding of the importance of medication adherence?: No    Adherence Questions  Linked Medication(s) Assessed: Empagliflozin (Jardiance), SITagliptin Phosphate (JANUVIA)  On average, how many doses/injections does the patient miss per month?: 0  What are the identified reasons for non-adherence or missed doses? : no problems identified  What is the estimated medication adherence level?: (S) % (Both jardiance and januvia PDC falsely low. Since enrolling in our pharmacy program 10/28/2024 he has filled both medications on time. Patient reports no missed doses)  Based on the patient/caregiver response and refill history, does this patient require an MTP to track adherence  improvements?: no    Additional Barriers to Patient Self-Administration: none identified  Methods for Supporting Patient Self-Administration: n/a    Open Medication Therapy Problems  No medication therapy recommendations to display    Adverse Drug Reactions  Medication tolerability: Tolerating with no to minimal ADRs  Medication plan: Continue therapy with normal follow-up  Plan for ADR Management: n/a    Goals of Therapy  Goals related to the patient's specialty therapy were discussed with the patient. The Patient Goals segment of this outreach has been reviewed and updated.   Goals Addressed Today        Specialty Pharmacy General Goal      A1C < 7 %     Lab Results    Component Value Date Notes    HGBA1C 7.6 4/28/2025 In person reassessment. A1C decrease and is getting close to goal. Switching from levemir to lantus d/t insuance formulary change. Continue all other Dm meds as prescribed. NB      04/18/2025 Phone reassessment. No new lab values. Patient doing well on medication regimen and reports no missed doses. NB    HGBA1C 8.2 (A) 10/28/2024 New enrollment. A1C slightly increased. Plan is to continue current medication regimen and work on diet and exercise. Dr. Harris is also checking to see if he is potentially type 1 instead of type 2 DM. NB    HGBA1C 8.1 04/25/2024                  Quality of Life Assessment   Quality of Life related to the patient's enrollment in the patient management program and services provided was discussed with the patient. The QOL segment of this outreach has been reviewed and updated.    Quality of Life Improvement Scale: 8-Moderately better    Reassessment Plan & Follow-Up  1. Medication Therapy Changes: Switch from levemir to lantus d/t insurance formulary change.  2. Related Plans, Therapy Recommendations, or Therapy Problems to Be Addressed: Follow A1C during office visits.  3. Pharmacist to perform regular assessments no more than (6) months from the previous assessment.  4.  Care Coordinator to set up future refill outreaches, coordinate prescription delivery, and escalate clinical questions to pharmacist.    Attestation  Therapeutic appropriateness: Appropriate   I attest the patient was actively involved in and has agreed to the above plan of care. If the prescribed therapy is at any point deemed not appropriate based on the current or future assessments, a consultation will be initiated with the patient's specialty care provider to determine the best course of action. The revised plan of therapy will be documented along with any required assessments and/or additional patient education provided.     Arvind Shannon, PharmD  Clinical Specialty Pharmacist, Endocrinology  4/29/2025  08:39 EDT    Discussed the aforementioned information with the patient via In-Person.

## 2025-05-15 ENCOUNTER — SPECIALTY PHARMACY (OUTPATIENT)
Age: 65
End: 2025-05-15
Payer: MEDICARE

## 2025-05-15 NOTE — PROGRESS NOTES
Specialty Pharmacy Patient Management Program  Endocrinology Refill Outreach      Tk is a 65 y.o. male contacted today regarding refills of his medication(s).    Specialty medication(s) and dose(s) confirmed: Januvia, Jardiance.    Refill Questions      Flowsheet Row Most Recent Value   Changes to allergies? No   Changes to medications? No   New conditions or infections since last clinic visit No   Unplanned office visit, urgent care, ED, or hospital admission in the last 4 weeks  No   How does patient/caregiver feel medication is working? Very good   Financial problems or insurance changes  No   Since the previous refill, were any specialty medication doses or scheduled injections missed or delayed?  No   Does this patient require a clinical escalation to a pharmacist? No          Delivery Questions      Flowsheet Row Most Recent Value   Delivery method UPS   Delivery address verified with patient/caregiver? Yes   Delivery address Home   Number of medications in delivery 2   Medication(s) being filled and delivered SITagliptin Phosphate (JANUVIA), Empagliflozin (Jardiance)   Doses left of specialty medications 1   Copay verified? Yes   Copay amount $24.30   Copay form of payment Credit/debit on file   Delivery Date Selection 05/16/25   Signature Required No   Do you consent to receive electronic handouts?  Yes            Follow-Up: 30d    Carlos Resendiz CPhT  Pharmacy Care Coordinator, Endocrinology  5/15/2025  10:41 EDT

## 2025-05-22 ENCOUNTER — SPECIALTY PHARMACY (OUTPATIENT)
Age: 65
End: 2025-05-22
Payer: MEDICARE

## 2025-05-22 NOTE — PROGRESS NOTES
Specialty Pharmacy Patient Management Program  Endocrinology Refill Outreach      Tk is a 65 y.o. male contacted today regarding refills of his medication(s).    Specialty medication(s) and dose(s) confirmed: None.    Refill Questions      Flowsheet Row Most Recent Value   Changes to allergies? No   Changes to medications? No   New conditions or infections since last clinic visit No   Unplanned office visit, urgent care, ED, or hospital admission in the last 4 weeks  No   How does patient/caregiver feel medication is working? Very good   Financial problems or insurance changes  No   Since the previous refill, were any specialty medication doses or scheduled injections missed or delayed?  No   Does this patient require a clinical escalation to a pharmacist? No          Delivery Questions      Flowsheet Row Most Recent Value   Delivery method UPS   Delivery address verified with patient/caregiver? Yes   Delivery address Home   Number of medications in delivery 2   Medication(s) being filled and delivered metFORMIN HCl (GLUCOPHAGE-XR), Repaglinide (PRANDIN)   Doses left of specialty medications 1   Copay verified? Yes   Copay amount $6.21   Copay form of payment Credit/debit on file   Delivery Date Selection 05/23/25   Signature Required No   Do you consent to receive electronic handouts?  Yes            Follow-Up: 30d    Carlos Resenidz CPhT  Pharmacy Care Coordinator, Endocrinology  5/22/2025  09:17 EDT

## 2025-06-09 ENCOUNTER — SPECIALTY PHARMACY (OUTPATIENT)
Age: 65
End: 2025-06-09
Payer: MEDICARE

## 2025-06-09 NOTE — PROGRESS NOTES
Specialty Pharmacy Patient Management Program  Endocrinology Refill Outreach      Tk is a 65 y.o. male contacted today regarding refills of his medication(s).    Specialty medication(s) and dose(s) confirmed: Januvia, Jardiance.    Refill Questions      Flowsheet Row Most Recent Value   Changes to allergies? No   Changes to medications? No   New conditions or infections since last clinic visit No   Unplanned office visit, urgent care, ED, or hospital admission in the last 4 weeks  No   How does patient/caregiver feel medication is working? Very good   Financial problems or insurance changes  No   Since the previous refill, were any specialty medication doses or scheduled injections missed or delayed?  No   Does this patient require a clinical escalation to a pharmacist? No          Delivery Questions      Flowsheet Row Most Recent Value   Delivery method UPS   Delivery address verified with patient/caregiver? Yes   Delivery address Home   Number of medications in delivery 2   Medication(s) being filled and delivered Empagliflozin (Jardiance), SITagliptin Phosphate (MAGALIUVIA)   Doses left of specialty medications 1   Copay verified? Yes   Copay amount $24.30   Copay form of payment Credit/debit on file   Delivery Date Selection 06/10/25   Signature Required No   Do you consent to receive electronic handouts?  Yes            Follow-Up: 30d    Carlos Resendiz CPhT  Pharmacy Care Coordinator, Endocrinology  6/9/2025  10:14 EDT

## 2025-06-16 ENCOUNTER — SPECIALTY PHARMACY (OUTPATIENT)
Dept: GENERAL RADIOLOGY | Facility: HOSPITAL | Age: 65
End: 2025-06-16
Payer: MEDICARE

## 2025-06-16 ENCOUNTER — SPECIALTY PHARMACY (OUTPATIENT)
Age: 65
End: 2025-06-16
Payer: MEDICARE

## 2025-06-16 RX ORDER — HYDROCHLOROTHIAZIDE 12.5 MG/1
CAPSULE ORAL
Qty: 6 EACH | Refills: 1 | Status: SHIPPED | OUTPATIENT
Start: 2025-06-16

## 2025-06-16 NOTE — PROGRESS NOTES
Specialty Pharmacy Patient Management Program  Endocrinology Refill Outreach      Tk is a 65 y.o. male contacted today regarding refills of his medication(s).    Specialty medication(s) and dose(s) confirmed: none.    Refill Questions      Flowsheet Row Most Recent Value   Changes to allergies? No   Changes to medications? No   New conditions or infections since last clinic visit No   Unplanned office visit, urgent care, ED, or hospital admission in the last 4 weeks  No   How does patient/caregiver feel medication is working? Very good   Financial problems or insurance changes  No   Since the previous refill, were any specialty medication doses or scheduled injections missed or delayed?  No   Does this patient require a clinical escalation to a pharmacist? No          Delivery Questions      Flowsheet Row Most Recent Value   Delivery method  at Pharmacy   Delivery address verified with patient/caregiver? Yes   Delivery address Home   Number of medications in delivery 3   Medication(s) being filled and delivered Continuous Glucose Sensor (FreeStyle Bertin 3 Plus Sensor), metFORMIN HCl (GLUCOPHAGE-XR), Repaglinide (PRANDIN)   Doses left of specialty medications 1   Copay verified? Yes   Copay amount $120.53   Copay form of payment Pay at pickup   Delivery Date Selection 06/16/25   Signature Required No   Do you consent to receive electronic handouts?  Yes            Follow-Up: 30d    Carlos Resendiz CPhT  Pharmacy Care Coordinator, Endocrinology  6/16/2025  11:24 EDT

## 2025-06-16 NOTE — PROGRESS NOTES
Specialty Pharmacy Patient Management Program  Per Protocol Prescription Order/Refill     Patient currently fills medications at [facility] and is enrolled in an Endocrinology Patient Management Program.     Requested Prescriptions     Pending Prescriptions Disp Refills    Continuous Glucose Sensor (FreeStyle Bertin 3 Plus Sensor) 6 each 1     Sig: Use Every 15 (Fifteen) Days.     Prescription orders above were sent to the pharmacy per Collaborative Care Agreement Protocol.     Arvind Shannon, PharmD  Clinical Specialty Pharmacist, Endocrinology  6/16/2025  09:14 EDT

## 2025-06-18 ENCOUNTER — SPECIALTY PHARMACY (OUTPATIENT)
Age: 65
End: 2025-06-18
Payer: MEDICARE

## 2025-06-18 NOTE — PROGRESS NOTES
Specialty Pharmacy Patient Management Program  Endocrinology Refill Outreach      Tk is a 65 y.o. male contacted today regarding refills of his medication(s).    Specialty medication(s) and dose(s) confirmed: Lantus.    Refill Questions      Flowsheet Row Most Recent Value   Changes to allergies? No   Changes to medications? No   New conditions or infections since last clinic visit No   Unplanned office visit, urgent care, ED, or hospital admission in the last 4 weeks  No   How does patient/caregiver feel medication is working? Very good   Financial problems or insurance changes  No   Since the previous refill, were any specialty medication doses or scheduled injections missed or delayed?  No   Does this patient require a clinical escalation to a pharmacist? No          Delivery Questions      Flowsheet Row Most Recent Value   Delivery method UPS   Delivery address verified with patient/caregiver? Yes   Delivery address Home   Number of medications in delivery 1   Medication(s) being filled and delivered Insulin Glargine (Lantus SoloStar)   Doses left of specialty medications 1   Copay verified? Yes   Copay amount $12.15   Copay form of payment Credit/debit on file   Delivery Date Selection 06/19/25   Signature Required No   Do you consent to receive electronic handouts?  Yes            Follow-Up: matteo Resendiz CPhT  Pharmacy Care Coordinator, Endocrinology  6/18/2025  10:35 EDT

## 2025-07-15 ENCOUNTER — SPECIALTY PHARMACY (OUTPATIENT)
Age: 65
End: 2025-07-15
Payer: MEDICARE

## 2025-07-15 NOTE — PROGRESS NOTES
Specialty Pharmacy Patient Management Program  Endocrinology Refill Outreach      Tk is a 65 y.o. male contacted today regarding refills of his medication(s).    Specialty medication(s) and dose(s) confirmed: Januvia, Jardiance, Lantus.    Refill Questions      Flowsheet Row Most Recent Value   Changes to allergies? No   Changes to medications? No   New conditions or infections since last clinic visit No   Unplanned office visit, urgent care, ED, or hospital admission in the last 4 weeks  No   How does patient/caregiver feel medication is working? Very good   Financial problems or insurance changes  No   Since the previous refill, were any specialty medication doses or scheduled injections missed or delayed?  No   Does this patient require a clinical escalation to a pharmacist? No          Delivery Questions      Flowsheet Row Most Recent Value   Delivery method UPS   Delivery address verified with patient/caregiver? Yes   Delivery address Home   Number of medications in delivery 5   Medication(s) being filled and delivered SITagliptin Phosphate (JANUVIA), Empagliflozin (Jardiance), metFORMIN HCl (GLUCOPHAGE-XR), Repaglinide (PRANDIN), Insulin Glargine (Lantus SoloStar)   Doses left of specialty medications 1   Copay verified? Yes   Copay amount $42.66   Copay form of payment Credit/debit on file   Delivery Date Selection 07/16/25   Signature Required No   Do you consent to receive electronic handouts?  Yes            Follow-Up: 18d    Carlos Resendiz CPhT  Pharmacy Care Coordinator, Endocrinology  7/15/2025  10:16 EDT

## 2025-07-29 ENCOUNTER — SPECIALTY PHARMACY (OUTPATIENT)
Dept: GENERAL RADIOLOGY | Facility: HOSPITAL | Age: 65
End: 2025-07-29
Payer: MEDICARE

## 2025-07-29 ENCOUNTER — SPECIALTY PHARMACY (OUTPATIENT)
Age: 65
End: 2025-07-29
Payer: MEDICARE

## 2025-07-29 RX ORDER — INSULIN GLARGINE 100 [IU]/ML
60 INJECTION, SOLUTION SUBCUTANEOUS DAILY
Qty: 45 ML | Refills: 3 | Status: SHIPPED | OUTPATIENT
Start: 2025-07-29

## 2025-07-29 NOTE — PROGRESS NOTES
Specialty Pharmacy Patient Management Program  Per Protocol Prescription Order/Refill     Patient currently fills medications at [facility] and is enrolled in an Endocrinology Patient Management Program.     Requested Prescriptions     Pending Prescriptions Disp Refills    Insulin Glargine (BASAGLAR KWIKPEN) 100 UNIT/ML injection pen 45 mL 3     Sig: Inject 60 Units under the skin into the appropriate area as directed Daily. (Max daily dose 80 units)     Prescription orders above were sent to the pharmacy per Collaborative Care Agreement Protocol.     Arvind Shannon, PharmD  Clinical Specialty Pharmacist, Endocrinology  7/29/2025  10:53 EDT

## 2025-07-29 NOTE — PROGRESS NOTES
Specialty Pharmacy Patient Management Program  Endocrinology Refill Outreach      Tk is a 65 y.o. male contacted today regarding refills of his medication(s).    Specialty medication(s) and dose(s) confirmed: Basaglar.    Refill Questions      Flowsheet Row Most Recent Value   Changes to allergies? No   Changes to medications? No   New conditions or infections since last clinic visit No   Unplanned office visit, urgent care, ED, or hospital admission in the last 4 weeks  No   How does patient/caregiver feel medication is working? Very good   Financial problems or insurance changes  No   Since the previous refill, were any specialty medication doses or scheduled injections missed or delayed?  No   Does this patient require a clinical escalation to a pharmacist? No          Delivery Questions      Flowsheet Row Most Recent Value   Delivery method UPS   Delivery address verified with patient/caregiver? Yes   Delivery address Home   Number of medications in delivery 1   Medication(s) being filled and delivered Insulin Glargine   Doses left of specialty medications 1   Copay verified? Yes   Copay amount $12.15   Copay form of payment Credit/debit on file   Delivery Date Selection 07/30/25   Signature Required No   Do you consent to receive electronic handouts?  Yes            Follow-Up: linda Resendiz CPhT  Pharmacy Care Coordinator, Endocrinology  7/29/2025  12:39 EDT

## 2025-08-11 ENCOUNTER — SPECIALTY PHARMACY (OUTPATIENT)
Age: 65
End: 2025-08-11
Payer: MEDICARE